# Patient Record
Sex: FEMALE | Race: WHITE | Employment: OTHER | URBAN - METROPOLITAN AREA
[De-identification: names, ages, dates, MRNs, and addresses within clinical notes are randomized per-mention and may not be internally consistent; named-entity substitution may affect disease eponyms.]

---

## 2017-04-28 ENCOUNTER — HOSPITAL ENCOUNTER (OUTPATIENT)
Dept: OTHER | Age: 67
Discharge: HOME | End: 2017-04-28
Attending: INTERNAL MEDICINE | Admitting: INTERNAL MEDICINE

## 2017-04-28 LAB
A/G RATIO: 1.2 (ref 1.1–2.2)
ALBUMIN SERPL-MCNC: 4.1 G/DL (ref 3.4–5)
ALP BLD-CCNC: 94 U/L (ref 40–129)
ALT SERPL-CCNC: 15 U/L (ref 10–40)
ANION GAP SERPL CALCULATED.3IONS-SCNC: 15 MMOL/L (ref 3–16)
AST SERPL-CCNC: 14 U/L (ref 15–37)
BASOPHILS ABSOLUTE: 0.1 K/UL (ref 0–0.2)
BASOPHILS RELATIVE PERCENT: 0.8 %
BILIRUB SERPL-MCNC: 0.8 MG/DL (ref 0–1)
BUN BLDV-MCNC: 15 MG/DL (ref 7–20)
CALCIUM SERPL-MCNC: 8.3 MG/DL (ref 8.3–10.6)
CHLORIDE BLD-SCNC: 101 MMOL/L (ref 99–110)
CHOLESTEROL, TOTAL: 150 MG/DL (ref 0–199)
CO2: 23 MMOL/L (ref 21–32)
CREAT SERPL-MCNC: 0.6 MG/DL (ref 0.6–1.2)
EOSINOPHILS ABSOLUTE: 0.1 K/UL (ref 0–0.6)
EOSINOPHILS RELATIVE PERCENT: 1.3 %
GFR AFRICAN AMERICAN: >60
GFR NON-AFRICAN AMERICAN: >60
GLOBULIN: 3.3 G/DL
GLUCOSE BLD-MCNC: 148 MG/DL (ref 70–99)
HCT VFR BLD CALC: 30.4 % (ref 36–48)
HDLC SERPL-MCNC: 39 MG/DL (ref 40–60)
HEMOGLOBIN: 9 G/DL (ref 12–16)
LDL CHOLESTEROL CALCULATED: 89 MG/DL
LYMPHOCYTES ABSOLUTE: 2.3 K/UL (ref 1–5.1)
LYMPHOCYTES RELATIVE PERCENT: 27.5 %
MCH RBC QN AUTO: 22.6 PG (ref 26–34)
MCHC RBC AUTO-ENTMCNC: 29.4 G/DL (ref 31–36)
MCV RBC AUTO: 76.8 FL (ref 80–100)
MONOCYTES ABSOLUTE: 0.7 K/UL (ref 0–1.3)
MONOCYTES RELATIVE PERCENT: 8.6 %
NEUTROPHILS ABSOLUTE: 5.2 K/UL (ref 1.7–7.7)
NEUTROPHILS RELATIVE PERCENT: 61.8 %
PDW BLD-RTO: 17.5 % (ref 12.4–15.4)
PLATELET # BLD: 252 K/UL (ref 135–450)
PMV BLD AUTO: 9.8 FL (ref 5–10.5)
POTASSIUM SERPL-SCNC: 4.5 MMOL/L (ref 3.5–5.1)
RBC # BLD: 3.96 M/UL (ref 4–5.2)
SODIUM BLD-SCNC: 139 MMOL/L (ref 136–145)
TOTAL PROTEIN: 7.4 G/DL (ref 6.4–8.2)
TRIGL SERPL-MCNC: 108 MG/DL (ref 0–150)
TSH SERPL DL<=0.05 MIU/L-ACNC: 8.03 UIU/ML (ref 0.27–4.2)
VLDLC SERPL CALC-MCNC: 22 MG/DL
WBC # BLD: 8.4 K/UL (ref 4–11)

## 2018-06-29 ENCOUNTER — HOSPITAL ENCOUNTER (OUTPATIENT)
Dept: OTHER | Age: 68
Discharge: HOME | End: 2018-06-29
Attending: INTERNAL MEDICINE | Admitting: INTERNAL MEDICINE

## 2018-06-29 LAB
A/G RATIO: 1.5 (ref 1.1–2.2)
ALBUMIN SERPL-MCNC: 4.2 G/DL (ref 3.4–5)
ALP BLD-CCNC: 76 U/L (ref 40–129)
ALT SERPL-CCNC: 17 U/L (ref 10–40)
ANION GAP SERPL CALCULATED.3IONS-SCNC: 12 MMOL/L (ref 3–16)
AST SERPL-CCNC: 13 U/L (ref 15–37)
BILIRUB SERPL-MCNC: 1.2 MG/DL (ref 0–1)
BUN BLDV-MCNC: 17 MG/DL (ref 7–20)
CALCIUM SERPL-MCNC: 8.7 MG/DL (ref 8.3–10.6)
CHLORIDE BLD-SCNC: 102 MMOL/L (ref 99–110)
CHOLESTEROL, TOTAL: 174 MG/DL (ref 0–199)
CO2: 26 MMOL/L (ref 21–32)
CREAT SERPL-MCNC: 0.6 MG/DL (ref 0.6–1.2)
GFR AFRICAN AMERICAN: >60
GFR NON-AFRICAN AMERICAN: >60
GLOBULIN: 2.8 G/DL
GLUCOSE BLD-MCNC: 117 MG/DL (ref 70–99)
HDLC SERPL-MCNC: 47 MG/DL (ref 40–60)
LDL CHOLESTEROL CALCULATED: 100 MG/DL
POTASSIUM SERPL-SCNC: 4 MMOL/L (ref 3.5–5.1)
SODIUM BLD-SCNC: 140 MMOL/L (ref 136–145)
TOTAL PROTEIN: 7 G/DL (ref 6.4–8.2)
TRIGL SERPL-MCNC: 135 MG/DL (ref 0–150)
VLDLC SERPL CALC-MCNC: 27 MG/DL

## 2018-06-30 LAB
ESTIMATED AVERAGE GLUCOSE: 91.1 MG/DL
HBA1C MFR BLD: 4.8 %

## 2018-07-24 ENCOUNTER — APPOINTMENT (OUTPATIENT)
Dept: GENERAL RADIOLOGY | Age: 68
End: 2018-07-24
Payer: MEDICARE

## 2018-07-24 ENCOUNTER — HOSPITAL ENCOUNTER (EMERGENCY)
Age: 68
Discharge: HOME | End: 2018-07-24
Payer: MEDICARE

## 2018-07-24 VITALS
OXYGEN SATURATION: 95 % | TEMPERATURE: 96.1 F | DIASTOLIC BLOOD PRESSURE: 70 MMHG | HEART RATE: 60 BPM | BODY MASS INDEX: 53.92 KG/M2 | RESPIRATION RATE: 17 BRPM | SYSTOLIC BLOOD PRESSURE: 117 MMHG | WEIGHT: 293 LBS | HEIGHT: 62 IN

## 2018-07-24 DIAGNOSIS — M25.552 PAIN OF BOTH HIP JOINTS: ICD-10-CM

## 2018-07-24 DIAGNOSIS — S90.32XA CONTUSION OF LEFT FOOT, INITIAL ENCOUNTER: Primary | ICD-10-CM
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
## 2018-07-24 DIAGNOSIS — M25.551 PAIN OF BOTH HIP JOINTS: ICD-10-CM

## 2018-07-24 PROCEDURE — 6370000000 HC RX 637 (ALT 250 FOR IP): Performed by: PHYSICIAN ASSISTANT

## 2018-07-24 PROCEDURE — 73630 X-RAY EXAM OF FOOT: CPT

## 2018-07-24 PROCEDURE — 99283 EMERGENCY DEPT VISIT LOW MDM: CPT

## 2018-07-24 PROCEDURE — 72170 X-RAY EXAM OF PELVIS: CPT

## 2018-07-24 RX ORDER — OXYCODONE HYDROCHLORIDE AND ACETAMINOPHEN 5; 325 MG/1; MG/1
1 TABLET ORAL ONCE
Status: COMPLETED | OUTPATIENT
Start: 2018-07-24 | End: 2018-07-24

## 2018-07-24 RX ORDER — IBUPROFEN 600 MG/1
600 TABLET ORAL EVERY 8 HOURS PRN
Qty: 30 TABLET | Refills: 0 | Status: SHIPPED | OUTPATIENT
Start: 2018-07-24 | End: 2018-09-09 | Stop reason: ALTCHOICE

## 2018-07-24 RX ADMIN — OXYCODONE HYDROCHLORIDE AND ACETAMINOPHEN 1 TABLET: 5; 325 TABLET ORAL at 11:47

## 2018-07-24 ASSESSMENT — PAIN DESCRIPTION - ORIENTATION: ORIENTATION: LEFT

## 2018-07-24 ASSESSMENT — PAIN DESCRIPTION - DESCRIPTORS: DESCRIPTORS: STABBING

## 2018-07-24 ASSESSMENT — PAIN DESCRIPTION - FREQUENCY: FREQUENCY: CONTINUOUS

## 2018-07-24 ASSESSMENT — PAIN SCALES - GENERAL
PAINLEVEL_OUTOF10: 10
PAINLEVEL_OUTOF10: 10

## 2018-07-24 ASSESSMENT — PAIN DESCRIPTION - LOCATION: LOCATION: FOOT

## 2018-07-24 ASSESSMENT — PAIN DESCRIPTION - PAIN TYPE: TYPE: ACUTE PAIN

## 2018-07-24 NOTE — ED NOTES
Post op shoe placed on patient, she refused crutches because she has a walker at home.      Areli Wagner  07/24/18 2371

## 2018-07-24 NOTE — ED NOTES
Discharge instructions reviewed with Ms. Adelia Felty. She verbalized understanding. Copy of discharge instructions and prescriptions refused, walker given to patients  per order. Ms. Adelia Felty was discharged to home in good condition per personal vehicle, friend/family driving. She exited the ED without difficulty.         Kwesi Elliott RN  07/24/18 3579

## 2018-07-24 NOTE — ED PROVIDER NOTES
Seen independently        [facility]  eMERGENCY dEPARTMENT eNCOUnter        Pt Name: Carlos Eduardo Garg  MRN: [id number]  [place] 1950  Date of evaluation: 7/24/2018  Provider: Bao Corona PA-C  PCP: Aline Ramsey MD  ED Attending: No att. providers found    [facility]       Chief Complaint   Patient presents with    Foot Pain     pt sts she hurt her left foot yesterday. she got caught in her purse and fell. denies LOC        HISTORY OF PRESENT ILLNESS   (Location/Symptom, Timing/Onset, Context/Setting, Quality, Duration, Modifying Factors, Severity)  Note limiting factors. Carlos Eduardo Garg is a 74 y.o. female patient presents to the emergency department with left foot pain after fall yesterday. The patient states that her foot got caught in her purse that was on the ground at home last night around 9:30. She tripped and fell trying to catch herself on a stand, but continued to fall. She states that this stand hit her in the pelvis and she's had pain since. She states that when she continued to fall she tried to grab a chair, but it toppled down too. She states that she did not hit her head or lose consciousness. No neck or back pain. She states that her left big toe either went forward and backward. She stated that she heard a snap in his had severe left foot and toe pain. She describes it as a 8 out of 10 sharp pain currently. She states that she is having some numbness and hurts to move her toes. She did use ice and did take a oxycodone that she has for arthritis. She states that did help somewhat. Nursing Notes were all reviewed and agreed with or any disagreements were addressed  in the HPI. REVIEW OF SYSTEMS    (2-9 systems for level 4, 10 or more for level 5)     Review of Systems    Positives and Pertinent negatives as per HPI. Except as noted above in the ROS, all other systems were reviewed and negative.        PAST MEDICAL HISTORY     Past Medical eye exhibits no discharge. No scleral icterus. Neck: Normal range of motion. Neck supple. Cardiovascular: Normal rate, regular rhythm, normal heart sounds and intact distal pulses. Exam reveals no gallop and no friction rub. No murmur heard. Pulmonary/Chest: Effort normal and breath sounds normal. No respiratory distress. She has no wheezes. She has no rales. Musculoskeletal:        Right hip: She exhibits tenderness. She exhibits normal range of motion, normal strength, no bony tenderness, no swelling, no crepitus, no deformity and no laceration. Left hip: She exhibits tenderness. She exhibits normal range of motion, normal strength, no bony tenderness, no swelling, no crepitus, no deformity and no laceration. Left ankle: Normal. No tenderness. Achilles tendon normal.        Left foot: There is decreased range of motion (1st and 2nd toe), tenderness, bony tenderness (Is still first metatarsal as well as first and second toe) and swelling (Mild swelling of the first and second toe). There is normal capillary refill, no crepitus, no deformity and no laceration. Neurological: She is alert and oriented to person, place, and time. Skin: Skin is warm and dry. She is not diaphoretic. No pallor. Psychiatric: She has a normal mood and affect. Her behavior is normal. Thought content normal.   Nursing note and vitals reviewed. DIAGNOSTIC RESULTS   LABS:    Labs Reviewed - No data to display    All other labs were within normal range or not returned as of this dictation. EKG: All EKG's are interpreted by the Emergency Department Physician who either signs or Co-signs this chart in the absence of a cardiologist.  Please see their note for interpretation of EKG.       RADIOLOGY:   Non-plain film images such as CT, Ultrasound and MRI are read by the radiologist. Plain radiographic images are visualized and preliminarily interpreted by the  ED Provider with the below or stumbling, initial encounter          DISPOSITION/PLAN   DISPOSITION Decision To Discharge 07/24/2018 11:48:12 AM      PATIENT REFERRED TO:  Muna Chi MD  [address] [place] [address]  [phone]    Schedule an appointment as soon as possible for a visit       [facility] [place] [facility]  [address] [place] [facility]  Go to   If symptoms worsen    Jace Abarca  [address] [facility]  [phone]  Schedule an appointment as soon as possible for a visit         DISCHARGE MEDICATIONS:  Discharge Medication List as of 7/24/2018 11:50 AM      START taking these medications    Details   ibuprofen (ADVIL;MOTRIN) 600 MG tablet Take 1 tablet by mouth every 8 hours as needed for Pain, Disp-30 tablet, R-0Print             DISCONTINUED MEDICATIONS:  Discharge Medication List as of 7/24/2018 11:50 AM      STOP taking these medications       aspirin 81 MG chewable tablet Comments:   Reason for Stopping:         diltiazem (TIAZAC) 240 MG extended release capsule Comments:   Reason for Stopping:                      (Please note that portions of this note were completed with a voice recognition program.  Efforts were made to edit the dictations but occasionally words are mis-transcribed.)    Telma Resendiz PA-C (electronically signed)            Anamaria Real PA-C  07/24/18 1502

## 2018-09-09 ENCOUNTER — APPOINTMENT (OUTPATIENT)
Dept: GENERAL RADIOLOGY | Age: 68
End: 2018-09-09
Payer: MEDICARE

## 2018-09-09 ENCOUNTER — HOSPITAL ENCOUNTER (EMERGENCY)
Age: 68
Discharge: HOME | End: 2018-09-09
Attending: EMERGENCY MEDICINE
Payer: MEDICARE

## 2018-09-09 VITALS
RESPIRATION RATE: 20 BRPM | TEMPERATURE: 97.3 F | HEIGHT: 62 IN | WEIGHT: 293 LBS | BODY MASS INDEX: 53.92 KG/M2 | HEART RATE: 66 BPM | SYSTOLIC BLOOD PRESSURE: 153 MMHG | DIASTOLIC BLOOD PRESSURE: 94 MMHG | OXYGEN SATURATION: 94 %

## 2018-09-09 DIAGNOSIS — J18.9 PNEUMONIA DUE TO ORGANISM: Primary | ICD-10-CM

## 2018-09-09 DIAGNOSIS — J44.1 COPD EXACERBATION (HCC): ICD-10-CM

## 2018-09-09 DIAGNOSIS — R05.9 COUGH: ICD-10-CM

## 2018-09-09 LAB
A/G RATIO: 1.4 (ref 1.1–2.2)
ALBUMIN SERPL-MCNC: 4.4 G/DL (ref 3.4–5)
ALP BLD-CCNC: 95 U/L (ref 40–129)
ALT SERPL-CCNC: 38 U/L (ref 10–40)
ANION GAP SERPL CALCULATED.3IONS-SCNC: 12 MMOL/L (ref 3–16)
AST SERPL-CCNC: 21 U/L (ref 15–37)
BASOPHILS ABSOLUTE: 0 K/UL (ref 0–0.2)
BASOPHILS RELATIVE PERCENT: 0.3 %
BILIRUB SERPL-MCNC: 1.2 MG/DL (ref 0–1)
BUN BLDV-MCNC: 17 MG/DL (ref 7–20)
CALCIUM SERPL-MCNC: 8.7 MG/DL (ref 8.3–10.6)
CHLORIDE BLD-SCNC: 102 MMOL/L (ref 99–110)
CO2: 27 MMOL/L (ref 21–32)
CREAT SERPL-MCNC: 0.6 MG/DL (ref 0.6–1.2)
EOSINOPHILS ABSOLUTE: 0.2 K/UL (ref 0–0.6)
EOSINOPHILS RELATIVE PERCENT: 1.2 %
GFR AFRICAN AMERICAN: >60
GFR NON-AFRICAN AMERICAN: >60
GLOBULIN: 3.1 G/DL
GLUCOSE BLD-MCNC: 131 MG/DL (ref 70–99)
HCT VFR BLD CALC: 38.4 % (ref 36–48)
HEMOGLOBIN: 12.5 G/DL (ref 12–16)
LYMPHOCYTES ABSOLUTE: 2.6 K/UL (ref 1–5.1)
LYMPHOCYTES RELATIVE PERCENT: 18.6 %
MCH RBC QN AUTO: 30.3 PG (ref 26–34)
MCHC RBC AUTO-ENTMCNC: 32.6 G/DL (ref 31–36)
MCV RBC AUTO: 92.9 FL (ref 80–100)
MONOCYTES ABSOLUTE: 0.8 K/UL (ref 0–1.3)
MONOCYTES RELATIVE PERCENT: 5.3 %
NEUTROPHILS ABSOLUTE: 10.6 K/UL (ref 1.7–7.7)
NEUTROPHILS RELATIVE PERCENT: 74.6 %
PDW BLD-RTO: 14.1 % (ref 12.4–15.4)
PLATELET # BLD: 189 K/UL (ref 135–450)
PMV BLD AUTO: 9.8 FL (ref 5–10.5)
POTASSIUM SERPL-SCNC: 4 MMOL/L (ref 3.5–5.1)
RBC # BLD: 4.13 M/UL (ref 4–5.2)
SODIUM BLD-SCNC: 141 MMOL/L (ref 136–145)
TOTAL PROTEIN: 7.5 G/DL (ref 6.4–8.2)
WBC # BLD: 14.2 K/UL (ref 4–11)

## 2018-09-09 PROCEDURE — 85025 COMPLETE CBC W/AUTO DIFF WBC: CPT

## 2018-09-09 PROCEDURE — 96374 THER/PROPH/DIAG INJ IV PUSH: CPT

## 2018-09-09 PROCEDURE — 87040 BLOOD CULTURE FOR BACTERIA: CPT

## 2018-09-09 PROCEDURE — 99285 EMERGENCY DEPT VISIT HI MDM: CPT

## 2018-09-09 PROCEDURE — 6370000000 HC RX 637 (ALT 250 FOR IP): Performed by: EMERGENCY MEDICINE

## 2018-09-09 PROCEDURE — 80053 COMPREHEN METABOLIC PANEL: CPT

## 2018-09-09 PROCEDURE — 71046 X-RAY EXAM CHEST 2 VIEWS: CPT

## 2018-09-09 PROCEDURE — 6360000002 HC RX W HCPCS: Performed by: EMERGENCY MEDICINE

## 2018-09-09 RX ORDER — NEBULIZER ACCESSORIES
1 KIT MISCELLANEOUS DAILY PRN
Qty: 1 KIT | Refills: 0 | Status: SHIPPED | OUTPATIENT
Start: 2018-09-09

## 2018-09-09 RX ORDER — METHYLPREDNISOLONE SODIUM SUCCINATE 125 MG/2ML
125 INJECTION, POWDER, LYOPHILIZED, FOR SOLUTION INTRAMUSCULAR; INTRAVENOUS ONCE
Status: COMPLETED | OUTPATIENT
Start: 2018-09-09 | End: 2018-09-09

## 2018-09-09 RX ORDER — DOXYCYCLINE HYCLATE 100 MG
100 TABLET ORAL 2 TIMES DAILY
Qty: 20 TABLET | Refills: 0 | Status: SHIPPED | OUTPATIENT
Start: 2018-09-09 | End: 2018-09-19

## 2018-09-09 RX ORDER — IPRATROPIUM BROMIDE AND ALBUTEROL SULFATE 2.5; .5 MG/3ML; MG/3ML
1 SOLUTION RESPIRATORY (INHALATION) ONCE
Status: COMPLETED | OUTPATIENT
Start: 2018-09-09 | End: 2018-09-09

## 2018-09-09 RX ORDER — ALBUTEROL SULFATE 2.5 MG/3ML
2.5 SOLUTION RESPIRATORY (INHALATION) EVERY 4 HOURS PRN
Qty: 50 EACH | Refills: 0 | Status: SHIPPED | OUTPATIENT
Start: 2018-09-09

## 2018-09-09 RX ORDER — ALBUTEROL SULFATE 2.5 MG/3ML
2.5 SOLUTION RESPIRATORY (INHALATION)
Status: DISCONTINUED | OUTPATIENT
Start: 2018-09-09 | End: 2018-09-09 | Stop reason: HOSPADM

## 2018-09-09 RX ORDER — METHYLPREDNISOLONE 4 MG/1
TABLET ORAL
Qty: 1 KIT | Refills: 0 | Status: SHIPPED | OUTPATIENT
Start: 2018-09-09 | End: 2018-10-16 | Stop reason: ALTCHOICE

## 2018-09-09 RX ORDER — BENZONATATE 100 MG/1
100 CAPSULE ORAL 3 TIMES DAILY PRN
Qty: 15 CAPSULE | Refills: 0 | Status: SHIPPED | OUTPATIENT
Start: 2018-09-09 | End: 2018-09-14

## 2018-09-09 RX ADMIN — ALBUTEROL SULFATE 2.5 MG: 2.5 SOLUTION RESPIRATORY (INHALATION) at 16:18

## 2018-09-09 RX ADMIN — IPRATROPIUM BROMIDE AND ALBUTEROL SULFATE 1 AMPULE: .5; 3 SOLUTION RESPIRATORY (INHALATION) at 16:18

## 2018-09-09 RX ADMIN — METHYLPREDNISOLONE SODIUM SUCCINATE 125 MG: 125 INJECTION, POWDER, FOR SOLUTION INTRAMUSCULAR; INTRAVENOUS at 16:18

## 2018-09-09 ASSESSMENT — ENCOUNTER SYMPTOMS
VOMITING: 0
COUGH: 1
SORE THROAT: 0
NAUSEA: 0
WHEEZING: 1
SHORTNESS OF BREATH: 1
BACK PAIN: 0
GASTROINTESTINAL NEGATIVE: 1
DIARRHEA: 0
RHINORRHEA: 1
EYE DISCHARGE: 0
ABDOMINAL PAIN: 0

## 2018-09-09 ASSESSMENT — PAIN SCALES - GENERAL: PAINLEVEL_OUTOF10: 4

## 2018-09-09 ASSESSMENT — PAIN DESCRIPTION - PAIN TYPE: TYPE: ACUTE PAIN

## 2018-09-09 NOTE — ED PROVIDER NOTES
mmol/L    Potassium 4.0 3.5 - 5.1 mmol/L    Chloride 102 99 - 110 mmol/L    CO2 27 21 - 32 mmol/L    Anion Gap 12 3 - 16    Glucose 131 (H) 70 - 99 mg/dL    BUN 17 7 - 20 mg/dL    CREATININE 0.6 0.6 - 1.2 mg/dL    GFR Non-African American >60 >60    GFR African American >60 >60    Calcium 8.7 8.3 - 10.6 mg/dL    Total Protein 7.5 6.4 - 8.2 g/dL    Alb 4.4 3.4 - 5.0 g/dL    Albumin/Globulin Ratio 1.4 1.1 - 2.2    Total Bilirubin 1.2 (H) 0.0 - 1.0 mg/dL    Alkaline Phosphatase 95 40 - 129 U/L    ALT 38 10 - 40 U/L    AST 21 15 - 37 U/L    Globulin 3.1 g/dL       All other labs were within normal range or not returned as of this dictation. EKG: All EKG's are interpreted by the Emergency Department Physician who either signs or Co-signs this chart in the absence of a cardiologist.  Please see their note for interpretation of EKG. RADIOLOGY:   Non-plain film images such as CT, Ultrasound and MRI are read by the radiologist. Plain radiographic images are visualized and preliminarily interpreted by the  ED Provider with the below findings:    Interpretation per the Radiologist below, if available at the time of this note:    XR CHEST STANDARD (2 VW)   Final Result   Mild pulmonary vascular congestion. Slight right basilar atelectasis.                PROCEDURES   Unless otherwise noted below, none     Procedures    CRITICAL CARE TIME   N/A    CONSULTS:  None      EMERGENCY DEPARTMENT COURSE and DIFFERENTIAL DIAGNOSIS/MDM:   Vitals:    Vitals:    09/09/18 1533 09/09/18 1727 09/09/18 1744 09/09/18 1745   BP: (!) 146/61   (!) 153/94   Pulse: 59 72 66    Resp: 18  20    Temp: 97.3 °F (36.3 °C)      TempSrc: Oral      SpO2: 96% 93% 94%    Weight: 294 lb (133.4 kg)      Height: 5' 2\" (1.575 m)          Patient was given the following medications:  Medications   ipratropium-albuterol (DUONEB) nebulizer solution 1 ampule (1 ampule Inhalation Given 9/9/18 1618)   methylPREDNISolone sodium (SOLU-MEDROL) injection 125 mg (125 mg Disp-20 tablet, R-0Print      !! Respiratory Therapy Supplies (NEBULIZER COMPRESSOR) KIT ONCE Starting Sun 9/9/2018, 1 dose, Disp-1 kit, R-0, Print      !! Respiratory Therapy Supplies (NEBULIZER/TUBING/MOUTHPIECE) KIT DAILY PRN Starting Sun 9/9/2018, Disp-1 kit, R-0, Print      albuterol (PROVENTIL) (2.5 MG/3ML) 0.083% nebulizer solution Take 3 mLs by nebulization every 4 hours as needed for Wheezing, Disp-50 each, R-0Print      Spacer/Aero-Holding Chambers (E-Z SPACER) ANA DAILY PRN Starting Sun 9/9/2018, Disp-1 Device, R-0, Print      methylPREDNISolone (MEDROL, LIDIA,) 4 MG tablet Take by mouth., Disp-1 kit, R-0Print      benzonatate (TESSALON PERLES) 100 MG capsule Take 1 capsule by mouth 3 times daily as needed for Cough, Disp-15 capsule, R-0Print      albuterol (PROVENTIL HFA) 108 (90 BASE) MCG/ACT inhaler Inhale 2 puffs into the lungs every 6 hours as needed for Wheezing or Shortness of Breath., Disp-1 Inhaler, R-2Print       !! - Potential duplicate medications found. Please discuss with provider.           DISCONTINUED MEDICATIONS:  Discharge Medication List as of 9/9/2018  6:34 PM      STOP taking these medications       zolpidem (AMBIEN CR) 12.5 MG extended release tablet Comments:   Reason for Stopping:         esomeprazole Magnesium (NEXIUM) 20 MG PACK Comments:   Reason for Stopping:                      (Please note that portions of this note were completed with a voice recognition program.  Efforts were made to edit the dictations but occasionally words are mis-transcribed.)    Velia Molina MD (electronically signed)              Velia Molina MD  09/13/18 0320

## 2018-09-09 NOTE — ED NOTES
Discharge instructions reviewed with Ms. uCco Moy. She verbalized understanding. Copy of discharge instructions and prescriptions given. Ms. Cuco Moy was discharged to home in good condition per personal vehicle, friend/family driving. She exited the ED without difficulty.         Caitlin Ireland RN  09/09/18 9970

## 2018-09-09 NOTE — ED PROVIDER NOTES
[address] ED  eMERGENCY dEPARTMENT eNCOUnter        Pt Name: Toro Veliz  MRN: [id number]  [place] 1950  Date of evaluation: 9/9/2018  Provider: Jose Carlos Martin PA-C  PCP: Edwin Delcid MD  ED Attending: Uriel Solomon MD    CHIEF COMPLAINT       Chief Complaint   Patient presents with    Cough     Pt presents with c/o cough and shortness of breath x 2 weeks. Went to urgent care last week and put on abx and cough syrup. +chills. +diarrhea. +n/v.    Shortness of Breath       HISTORY OF PRESENT ILLNESS   (Location/Symptom, Timing/Onset, Context/Setting, Quality, Duration, Modifying Factors, Severity)  Note limiting factors. Toro Veliz is a 74 y.o. female presents with a weeklong history of a productive cough and shortness of breath. Onset 1 week ago. She was seen at urgent care last week and given Augmentin which she has not completed yet. She states that her cough has gotten better since starting Augmentin. She still is complaining of feeling short of breath. She started taking some Claritin as well. Nothing seems to aggravate her symptoms. Nothing seems to relieve her symptoms. Denies chest pain, fever, abdominal pain or nausea. Nursing Notes were all reviewed and agreed with or any disagreements were addressed  in the HPI. REVIEW OF SYSTEMS    (2-9 systems for level 4, 10 or more for level 5)     Review of Systems   Constitutional: Negative. HENT: Negative. Respiratory: Positive for cough and shortness of breath. Cardiovascular: Negative. Gastrointestinal: Negative. Genitourinary: Negative. Musculoskeletal: Negative. Skin: Negative. Neurological: Negative. Hematological: Negative. Positives and Pertinent negatives as per HPI. Except as noted above in the ROS, all other systems were reviewed and negative.        PAST MEDICAL HISTORY     Past Medical History:   Diagnosis Date    Anemia     Anxiety     Bleeding disorder (HCC)     chronic post gastric bypass surgery bleeding    Depression     Hernia of unspecified site of abdominal cavity without mention of obstruction or gangrene     Hernia of belly button. Pt states it's more raised recently    Hypertension     Hypothyroidism     Obese     Osteoarthritis     Other disorders of kidney and ureter     Psoriasis          SURGICAL HISTORY       Past Surgical History:   Procedure Laterality Date    CARDIAC CATHETERIZATION  2012    Cors WNL     SECTION      CHOLECYSTECTOMY      ESOPHAGEAL DILATATION      GALLBLADDER SURGERY      GASTRIC BYPASS SURGERY      PACEMAKER INSERTION  2013    generator replacement    PACEMAKER INSERTION  2006    initial implant    TOTAL KNEE ARTHROPLASTY           CURRENT MEDICATIONS       Discharge Medication List as of 2018  6:34 PM      CONTINUE these medications which have NOT CHANGED    Details   loratadine (CLARITIN) 10 MG tablet Take 10 mg by mouthHistorical Med      busPIRone (BUSPAR) 15 MG tablet Take by mouthHistorical Med      ferrous sulfate 325 (65 FE) MG tablet Take 325 mg by mouth every other day Every other dayHistorical Med      carvedilol (COREG) 12.5 MG tablet Take 1 tablet by mouth 2 times daily (with meals). , Disp-180 tablet, R-3      furosemide (LASIX) 20 MG tablet Take 1 tablet by mouth daily. , Disp-90 tablet, R-3      diltiazem (DILACOR XR) 240 MG ER capsule Take 1 capsule by mouth daily. , Disp-90 capsule, R-3      aspirin EC 81 MG EC tablet Take 1 tablet by mouth 2 times daily. , Disp-180 tablet, R-1      levothyroxine (SYNTHROID) 200 MCG tablet Take 150 mcg by mouth daily.       Lancets Thin MISC Historical Med      Blood Glucose Monitoring Suppl (BLOOD GLUCOSE MONITOR SYSTEM) w/Device KIT Historical Med      Blood Gluc Meter Disp-Strips (BLOOD GLUCOSE METER DISPOSABLE) ANA Historical Med      diclofenac (CATAFLAM) 50 MG tablet Take 1 tablet by mouth 3 times daily (with meals) for 10 days, Disp-30 tablet, R-0Print      Disposable Gloves (LATEX GLOVES LARGE) MISC PRN Starting 7/26/2014, Until Discontinued, Disp-2 each, R-0, Print      nitroGLYCERIN (NITROLINGUAL) 0.4 MG/SPRAY spray Place 1 spray under the tongue every 5 minutes as needed. ALLERGIES     Demerol    FAMILY HISTORY       Family History   Problem Relation Age of Onset    Alcohol Abuse Mother     Depression Mother     Mental Illness Mother    Josephineelizabeth Rivera Diabetes Father     Asthma Sister     Alcohol Abuse Sister     Other Sister         blood disease    Cancer Sister     Diabetes Sister     Alcohol Abuse Brother     Cancer Brother           SOCIAL HISTORY       Social History     Social History    Marital status:      Spouse name: N/A    Number of children: N/A    Years of education: N/A     Social History Main Topics    Smoking status: Never Smoker    Smokeless tobacco: Never Used    Alcohol use No    Drug use: No    Sexual activity: Yes     Partners: Male     Other Topics Concern    None     Social History Narrative    None       SCREENINGS             PHYSICAL EXAM    (up to 7 for level 4, 8 or more for level 5)     ED Triage Vitals [09/09/18 1533]   BP Temp Temp Source Pulse Resp SpO2 Height Weight   (!) 146/61 97.3 °F (36.3 °C) Oral 59 18 96 % 5' 2\" (1.575 m) 294 lb (133.4 kg)       Physical Exam   Constitutional: She is oriented to person, place, and time. She appears well-developed and well-nourished. HENT:   Head: Normocephalic and atraumatic. Nose: Nose normal.   Mouth/Throat: Uvula is midline, oropharynx is clear and moist and mucous membranes are normal.   Eyes: Right eye exhibits no discharge. Left eye exhibits no discharge. Neck: Normal range of motion. Neck supple. Cardiovascular: Normal rate, regular rhythm and normal heart sounds. Exam reveals no gallop. No murmur heard. Pulmonary/Chest: Effort normal. No respiratory distress. She has wheezes. She has no rales.  She TECHNOLOGIST PROVIDED HISTORY: Reason for exam:->cough Ordering Physician Provided Reason for Exam: Pt presents with c/o cough and shortness of breath x 2 weeks. Went to urgent care last week and put on abx and cough syrup. +chills. +diarrhea. +n/v.) Acute symptoms. Initial exam. FINDINGS: Heart size is normal.  The pulmonary vasculature is mildly prominent. No acute airspace disease or pleural effusion present. Lung volumes are somewhat low bilaterally. Slight right basilar atelectasis also present. Dual-chamber cardiac pacemaker in place. Mild pulmonary vascular congestion. Slight right basilar atelectasis. PROCEDURES   Unless otherwise noted below, none     Procedures    CRITICAL CARE TIME   N/A    CONSULTS:  None      EMERGENCY DEPARTMENT COURSE and DIFFERENTIAL DIAGNOSIS/MDM:   Vitals:    Vitals:    09/09/18 1533 09/09/18 1727 09/09/18 1744 09/09/18 1745   BP: (!) 146/61   (!) 153/94   Pulse: 59 72 66    Resp: 18  20    Temp: 97.3 °F (36.3 °C)      TempSrc: Oral      SpO2: 96% 93% 94%    Weight: 294 lb (133.4 kg)      Height: 5' 2\" (1.575 m)          Patient was given the following medications:  Medications   albuterol (PROVENTIL) nebulizer solution 2.5 mg (2.5 mg Nebulization Given 9/9/18 1618)   ipratropium-albuterol (DUONEB) nebulizer solution 1 ampule (1 ampule Inhalation Given 9/9/18 1618)   methylPREDNISolone sodium (SOLU-MEDROL) injection 125 mg (125 mg Intravenous Given 9/9/18 1618)       Patient presents with a weeklong history of a productive cough. On exam she is alert oriented afebrile hemodynamically stable breathing rapidly and room air satting at 96%. Oral mucosa is pink and moist.  Abdomen is soft and nontender. Some wheezing bilaterally mildly. We'll administer breathing treatments and Solu-Medrol here for symptomatic relief. Patient feels much better after administration of breathing treatments.  She was given Augmentin one week ago at urgent care and is still finishing Augmentin at this time. I told her to continue with Augmentin. Chest x-ray shows mild pulmonary vascular congestion with slight right basilar atelectasis. Plan will be to discharge patient at this time. See AVS for discharge medications and instructions. Advised to follow up in one week with Dr. Georgina Rachel. Patient was agreeable to the plan. All questions were answered at this time. I advised her to return with new or worsening symptoms. The patient tolerated their visit well. They were seen and evaluated by the attending physician who agreed with the assessment and plan. The patient and / or the family were informed of the results of any tests, a time was given to answer questions, a plan was proposed and they agreed with plan. FINAL IMPRESSION      1. Pneumonia due to organism    2. Cough    3. COPD exacerbation [facility])          DISPOSITION/PLAN   DISPOSITION        PATIENT REFERRED TO:  Dakota Stern [address]/[facility]  [phone]    Schedule an appointment as soon as possible for a visit in 1 week      Griselda Serna MD  [address] [facility] [place] [facility]  [phone]    Schedule an appointment as soon as possible for a visit in 1 week      [facility]  [address] [address]  [phone]  Go to   If symptoms worsen      DISCHARGE MEDICATIONS:  Discharge Medication List as of 9/9/2018  6:34 PM      START taking these medications    Details   doxycycline hyclate (VIBRA-TABS) 100 MG tablet Take 1 tablet by mouth 2 times daily for 10 days, Disp-20 tablet, R-0Print      !! Respiratory Therapy Supplies (NEBULIZER COMPRESSOR) KIT ONCE Starting Sun 9/9/2018, 1 dose, Disp-1 kit, R-0, Print      !!  Respiratory Therapy Supplies (NEBULIZER/TUBING/MOUTHPIECE) KIT DAILY PRN Starting Sun 9/9/2018, Disp-1 kit, R-0, Print      albuterol (PROVENTIL) (2.5 MG/3ML) 0.083% nebulizer solution Take 3 mLs by nebulization every 4 hours as needed for

## 2018-09-14 LAB — BLOOD CULTURE, ROUTINE: NORMAL

## 2018-10-16 ENCOUNTER — OFFICE VISIT (OUTPATIENT)
Dept: PULMONOLOGY | Age: 68
End: 2018-10-16
Payer: MEDICARE

## 2018-10-16 VITALS
BODY MASS INDEX: 53.26 KG/M2 | WEIGHT: 289.4 LBS | HEART RATE: 55 BPM | RESPIRATION RATE: 20 BRPM | DIASTOLIC BLOOD PRESSURE: 82 MMHG | SYSTOLIC BLOOD PRESSURE: 128 MMHG | HEIGHT: 62 IN | TEMPERATURE: 97.8 F | OXYGEN SATURATION: 96 %

## 2018-10-16 DIAGNOSIS — J31.1 POST-NASAL CATARRH: ICD-10-CM

## 2018-10-16 DIAGNOSIS — J44.9 CHRONIC OBSTRUCTIVE PULMONARY DISEASE, UNSPECIFIED COPD TYPE (HCC): Primary | ICD-10-CM

## 2018-10-16 PROCEDURE — G8484 FLU IMMUNIZE NO ADMIN: HCPCS | Performed by: INTERNAL MEDICINE

## 2018-10-16 PROCEDURE — 1101F PT FALLS ASSESS-DOCD LE1/YR: CPT | Performed by: INTERNAL MEDICINE

## 2018-10-16 PROCEDURE — 1090F PRES/ABSN URINE INCON ASSESS: CPT | Performed by: INTERNAL MEDICINE

## 2018-10-16 PROCEDURE — G8417 CALC BMI ABV UP PARAM F/U: HCPCS | Performed by: INTERNAL MEDICINE

## 2018-10-16 PROCEDURE — G8926 SPIRO NO PERF OR DOC: HCPCS | Performed by: INTERNAL MEDICINE

## 2018-10-16 PROCEDURE — 99204 OFFICE O/P NEW MOD 45 MIN: CPT | Performed by: INTERNAL MEDICINE

## 2018-10-16 PROCEDURE — 3017F COLORECTAL CA SCREEN DOC REV: CPT | Performed by: INTERNAL MEDICINE

## 2018-10-16 PROCEDURE — 3023F SPIROM DOC REV: CPT | Performed by: INTERNAL MEDICINE

## 2018-10-16 PROCEDURE — G8427 DOCREV CUR MEDS BY ELIG CLIN: HCPCS | Performed by: INTERNAL MEDICINE

## 2018-10-16 NOTE — PROGRESS NOTES
response:    IMAGING:  I personally reviewed and interpreted the following imaging today in the office:   CXR: 9/9/18 Mild pulmonary vascular congestion.  Slight right basilar atelectasis. ASSESSMENT:  · Acute bronchitis is resolved  · Chronic allergic rhinitis  · Environmental allergies  · GARCIA  · Morbid obesity    PLAN:   · PFTs with [facility] to evaluate for asthma  · Will call with results  · She may benefit from Singulair due to environmental allergies with chronic rhinitis.

## 2018-10-26 ENCOUNTER — TELEPHONE (OUTPATIENT)
Dept: PULMONOLOGY | Age: 68
End: 2018-10-26

## 2019-10-28 ENCOUNTER — APPOINTMENT (OUTPATIENT)
Dept: CT IMAGING | Age: 69
End: 2019-10-28
Payer: MEDICARE

## 2019-10-28 ENCOUNTER — HOSPITAL ENCOUNTER (EMERGENCY)
Age: 69
Discharge: HOME | End: 2019-10-28
Attending: EMERGENCY MEDICINE
Payer: MEDICARE

## 2019-10-28 VITALS
TEMPERATURE: 97.8 F | OXYGEN SATURATION: 96 % | SYSTOLIC BLOOD PRESSURE: 147 MMHG | RESPIRATION RATE: 16 BRPM | DIASTOLIC BLOOD PRESSURE: 60 MMHG | HEART RATE: 60 BPM

## 2019-10-28 DIAGNOSIS — R10.9 FLANK PAIN: Primary | ICD-10-CM

## 2019-10-28 LAB
A/G RATIO: 1.4 (ref 1.1–2.2)
ALBUMIN SERPL-MCNC: 4.2 G/DL (ref 3.4–5)
ALP BLD-CCNC: 78 U/L (ref 40–129)
ALT SERPL-CCNC: 15 U/L (ref 10–40)
ANION GAP SERPL CALCULATED.3IONS-SCNC: 11 MMOL/L (ref 3–16)
AST SERPL-CCNC: 14 U/L (ref 15–37)
BACTERIA: ABNORMAL /HPF
BASOPHILS ABSOLUTE: 0 K/UL (ref 0–0.2)
BASOPHILS RELATIVE PERCENT: 0.6 %
BILIRUB SERPL-MCNC: 1.1 MG/DL (ref 0–1)
BILIRUBIN URINE: NEGATIVE
BLOOD, URINE: ABNORMAL
BUN BLDV-MCNC: 13 MG/DL (ref 7–20)
CALCIUM SERPL-MCNC: 9.3 MG/DL (ref 8.3–10.6)
CHLORIDE BLD-SCNC: 102 MMOL/L (ref 99–110)
CLARITY: CLEAR
CO2: 25 MMOL/L (ref 21–32)
COLOR: YELLOW
CREAT SERPL-MCNC: 0.7 MG/DL (ref 0.6–1.2)
EOSINOPHILS ABSOLUTE: 0.1 K/UL (ref 0–0.6)
EOSINOPHILS RELATIVE PERCENT: 2 %
EPITHELIAL CELLS, UA: ABNORMAL /HPF
GFR AFRICAN AMERICAN: >60
GFR NON-AFRICAN AMERICAN: >60
GLOBULIN: 2.9 G/DL
GLUCOSE BLD-MCNC: 121 MG/DL (ref 70–99)
GLUCOSE URINE: NEGATIVE MG/DL
HCT VFR BLD CALC: 36 % (ref 36–48)
HEMOGLOBIN: 11.6 G/DL (ref 12–16)
KETONES, URINE: NEGATIVE MG/DL
LEUKOCYTE ESTERASE, URINE: NEGATIVE
LIPASE: 21 U/L (ref 13–60)
LYMPHOCYTES ABSOLUTE: 2.4 K/UL (ref 1–5.1)
LYMPHOCYTES RELATIVE PERCENT: 35.2 %
MCH RBC QN AUTO: 28.2 PG (ref 26–34)
MCHC RBC AUTO-ENTMCNC: 32.3 G/DL (ref 31–36)
MCV RBC AUTO: 87.4 FL (ref 80–100)
MICROSCOPIC EXAMINATION: YES
MONOCYTES ABSOLUTE: 0.4 K/UL (ref 0–1.3)
MONOCYTES RELATIVE PERCENT: 6.5 %
NEUTROPHILS ABSOLUTE: 3.8 K/UL (ref 1.7–7.7)
NEUTROPHILS RELATIVE PERCENT: 55.7 %
NITRITE, URINE: NEGATIVE
PDW BLD-RTO: 15.1 % (ref 12.4–15.4)
PH UA: 5.5 (ref 5–8)
PLATELET # BLD: 198 K/UL (ref 135–450)
PMV BLD AUTO: 9.3 FL (ref 5–10.5)
POTASSIUM REFLEX MAGNESIUM: 4 MMOL/L (ref 3.5–5.1)
PROTEIN UA: NEGATIVE MG/DL
RBC # BLD: 4.11 M/UL (ref 4–5.2)
RBC UA: ABNORMAL /HPF (ref 0–2)
SODIUM BLD-SCNC: 138 MMOL/L (ref 136–145)
SPECIFIC GRAVITY UA: 1.02 (ref 1–1.03)
TOTAL PROTEIN: 7.1 G/DL (ref 6.4–8.2)
URINE REFLEX TO CULTURE: ABNORMAL
URINE TYPE: ABNORMAL
UROBILINOGEN, URINE: 0.2 E.U./DL
WBC # BLD: 6.8 K/UL (ref 4–11)
WBC UA: ABNORMAL /HPF (ref 0–5)

## 2019-10-28 PROCEDURE — 6360000004 HC RX CONTRAST MEDICATION: Performed by: NURSE PRACTITIONER

## 2019-10-28 PROCEDURE — 83690 ASSAY OF LIPASE: CPT

## 2019-10-28 PROCEDURE — 96374 THER/PROPH/DIAG INJ IV PUSH: CPT

## 2019-10-28 PROCEDURE — 96360 HYDRATION IV INFUSION INIT: CPT

## 2019-10-28 PROCEDURE — 6360000002 HC RX W HCPCS: Performed by: NURSE PRACTITIONER

## 2019-10-28 PROCEDURE — 99284 EMERGENCY DEPT VISIT MOD MDM: CPT

## 2019-10-28 PROCEDURE — 80053 COMPREHEN METABOLIC PANEL: CPT

## 2019-10-28 PROCEDURE — 81001 URINALYSIS AUTO W/SCOPE: CPT

## 2019-10-28 PROCEDURE — 2580000003 HC RX 258: Performed by: NURSE PRACTITIONER

## 2019-10-28 PROCEDURE — 85025 COMPLETE CBC W/AUTO DIFF WBC: CPT

## 2019-10-28 PROCEDURE — 74177 CT ABD & PELVIS W/CONTRAST: CPT

## 2019-10-28 RX ORDER — 0.9 % SODIUM CHLORIDE 0.9 %
1000 INTRAVENOUS SOLUTION INTRAVENOUS ONCE
Status: COMPLETED | OUTPATIENT
Start: 2019-10-28 | End: 2019-10-28

## 2019-10-28 RX ORDER — OXYCODONE HYDROCHLORIDE AND ACETAMINOPHEN 5; 325 MG/1; MG/1
1 TABLET ORAL EVERY 4 HOURS PRN
COMMUNITY

## 2019-10-28 RX ORDER — ONDANSETRON 2 MG/ML
4 INJECTION INTRAMUSCULAR; INTRAVENOUS ONCE
Status: COMPLETED | OUTPATIENT
Start: 2019-10-28 | End: 2019-10-28

## 2019-10-28 RX ORDER — MORPHINE SULFATE 4 MG/ML
4 INJECTION, SOLUTION INTRAMUSCULAR; INTRAVENOUS ONCE
Status: DISCONTINUED | OUTPATIENT
Start: 2019-10-28 | End: 2019-10-28 | Stop reason: HOSPADM

## 2019-10-28 RX ADMIN — ONDANSETRON HYDROCHLORIDE 4 MG: 2 INJECTION, SOLUTION INTRAMUSCULAR; INTRAVENOUS at 19:32

## 2019-10-28 RX ADMIN — IOPAMIDOL 75 ML: 755 INJECTION, SOLUTION INTRAVENOUS at 18:48

## 2019-10-28 RX ADMIN — SODIUM CHLORIDE 1000 ML: 9 INJECTION, SOLUTION INTRAVENOUS at 19:07

## 2019-10-28 ASSESSMENT — ENCOUNTER SYMPTOMS
BLOOD IN STOOL: 0
SHORTNESS OF BREATH: 0
CONSTIPATION: 0
ABDOMINAL PAIN: 1
DIARRHEA: 1
VOMITING: 1
BACK PAIN: 1
COUGH: 0
NAUSEA: 1

## 2019-10-28 ASSESSMENT — PAIN SCALES - GENERAL
PAINLEVEL_OUTOF10: 6
PAINLEVEL_OUTOF10: 7

## 2020-01-29 ENCOUNTER — APPOINTMENT (OUTPATIENT)
Dept: GENERAL RADIOLOGY | Age: 70
End: 2020-01-29
Payer: MEDICARE

## 2020-01-29 ENCOUNTER — HOSPITAL ENCOUNTER (EMERGENCY)
Age: 70
Discharge: HOME | End: 2020-01-29
Attending: EMERGENCY MEDICINE
Payer: MEDICARE

## 2020-01-29 VITALS
HEIGHT: 62 IN | SYSTOLIC BLOOD PRESSURE: 154 MMHG | DIASTOLIC BLOOD PRESSURE: 85 MMHG | HEART RATE: 72 BPM | OXYGEN SATURATION: 99 % | WEIGHT: 284 LBS | RESPIRATION RATE: 18 BRPM | TEMPERATURE: 97.8 F | BODY MASS INDEX: 52.26 KG/M2

## 2020-01-29 PROCEDURE — 73562 X-RAY EXAM OF KNEE 3: CPT

## 2020-01-29 PROCEDURE — 99283 EMERGENCY DEPT VISIT LOW MDM: CPT

## 2020-01-29 ASSESSMENT — PAIN DESCRIPTION - PAIN TYPE: TYPE: ACUTE PAIN

## 2020-01-29 ASSESSMENT — PAIN DESCRIPTION - LOCATION: LOCATION: KNEE

## 2020-01-29 ASSESSMENT — PAIN DESCRIPTION - ORIENTATION: ORIENTATION: RIGHT

## 2020-01-29 ASSESSMENT — PAIN SCALES - GENERAL: PAINLEVEL_OUTOF10: 8

## 2020-02-05 ENCOUNTER — OFFICE VISIT (OUTPATIENT)
Dept: ORTHOPEDIC SURGERY | Age: 70
End: 2020-02-05
Payer: MEDICARE

## 2020-02-05 VITALS — BODY MASS INDEX: 52.25 KG/M2 | HEIGHT: 62 IN | WEIGHT: 283.95 LBS

## 2020-02-05 PROCEDURE — G8417 CALC BMI ABV UP PARAM F/U: HCPCS | Performed by: ORTHOPAEDIC SURGERY

## 2020-02-05 PROCEDURE — G8484 FLU IMMUNIZE NO ADMIN: HCPCS | Performed by: ORTHOPAEDIC SURGERY

## 2020-02-05 PROCEDURE — 99202 OFFICE O/P NEW SF 15 MIN: CPT | Performed by: ORTHOPAEDIC SURGERY

## 2020-02-05 PROCEDURE — G8427 DOCREV CUR MEDS BY ELIG CLIN: HCPCS | Performed by: ORTHOPAEDIC SURGERY

## 2020-02-05 PROCEDURE — 1090F PRES/ABSN URINE INCON ASSESS: CPT | Performed by: ORTHOPAEDIC SURGERY

## 2020-02-06 ENCOUNTER — HOSPITAL ENCOUNTER (OUTPATIENT)
Dept: PHYSICAL THERAPY | Age: 70
Setting detail: THERAPIES SERIES
Discharge: HOME | End: 2020-02-06
Payer: MEDICARE

## 2020-02-06 NOTE — FLOWSHEET NOTE
[address] [facility], [address] [place] [address], [address] [place] [address] [address]  Phone: [phone]   Fax:     [phone]    Physical Therapy  Cancellation/No-show Note  Patient Name:  Delfin Terrazas  :  1950   Date:  2020    Cancelled visits to date: 0  No-shows to date: 1    For today's appointment patient:  []  Cancelled  []  Rescheduled appointment  [x]  No-show     Reason given by patient:  []  Patient ill  []  Conflicting appointment  []  No transportation    []  Conflict with work  []  No reason given  [x]  Other: patient scheduled the appt yesterday and did not show today. Comments:      Phone call information:   []  Phone call made today to patient at _ time at number provided:      []  Patient answered, conversation as follows:    []  Patient did not answer, message left as follows:  [x]  Phone call not made today  []  Phone call not needed - pt contacted us to cancel and provided reason for cancellation.      Electronically signed by:  Anthony Kim PT

## 2020-02-06 NOTE — PROGRESS NOTES
aspirin EC 81 MG EC tablet Take 1 tablet by mouth 2 times daily. 180 tablet 1    levothyroxine (SYNTHROID) 200 MCG tablet Take 150 mcg by mouth daily.  nitroGLYCERIN (NITROLINGUAL) 0.4 MG/SPRAY spray Place 1 spray under the tongue every 5 minutes as needed.  Respiratory Therapy Supplies (NEBULIZER COMPRESSOR) KIT 1 kit by Does not apply route once for 1 dose 1 kit 0    albuterol (PROVENTIL HFA) 108 (90 BASE) MCG/ACT inhaler Inhale 2 puffs into the lungs every 6 hours as needed for Wheezing or Shortness of Breath. 1 Inhaler 2     No current facility-administered medications for this visit.         SOCIAL    Social History     Socioeconomic History    Marital status:      Spouse name: Not on file    Number of children: Not on file    Years of education: Not on file    Highest education level: Not on file   Occupational History    Not on file   Social Needs    Financial resource strain: Not on file    Food insecurity:     Worry: Not on file     Inability: Not on file    Transportation needs:     Medical: Not on file     Non-medical: Not on file   Tobacco Use    Smoking status: Never Smoker    Smokeless tobacco: Never Used   Substance and Sexual Activity    Alcohol use: No    Drug use: No    Sexual activity: Yes     Partners: Male   Lifestyle    Physical activity:     Days per week: Not on file     Minutes per session: Not on file    Stress: Not on file   Relationships    Social connections:     Talks on phone: Not on file     Gets together: Not on file     Attends Christian service: Not on file     Active member of club or organization: Not on file     Attends meetings of clubs or organizations: Not on file     Relationship status: Not on file    Intimate partner violence:     Fear of current or ex partner: Not on file     Emotionally abused: Not on file     Physically abused: Not on file     Forced sexual activity: Not on file   Other Topics Concern    Not on file   Social History Narrative    Not on file       FAMILY HISTORY    Family History   Problem Relation Age of Onset    Alcohol Abuse Mother     Depression Mother     Mental Illness Mother    Rafi Alejandre Diabetes Father     Asthma Sister     Alcohol Abuse Sister     Other Sister         blood disease    Cancer Sister     Diabetes Sister     Alcohol Abuse Brother     Cancer Brother        PHYSICAL EXAM    Ht 5' 2.01\" (1.575 m)   Wt 283 lb 15.2 oz (128.8 kg)   BMI 51.92 kg/m² [email or web address]   General:  Patient is alert and orientation to person place and time is age-appropriate. Gait:  Patient walks around the room and in the hallway with a slightly stifflegged antalgic gait. Balance and coordination are age-appropriate. Extremities: She demonstrates neutral alignment and can still achieve full extension. Patellofemoral crepitation is present. Quite significant medial joint line tenderness is present. She stable to varus and valgus stress at 0 and 30 degrees. She has negative Lockman and posterior drawer. I do not palpate any obvious popliteal masses. Negative Baron. Brisk capillary refill at the foot. Skin:  Normal on back and bilateral upper and lower extremities. Spine:  No deformity. Neurological:  Normal motor and sensory exam in both upper and lower extremities. Vascular exam:  Normal in both upper and lower extremities. IMAGING STUDIES    X-rays available for review from previous visits demonstrate moderately advanced osteoarthritic changes of the medial tibiofemoral and patellofemoral spaces. Small early marginal osteophytes. There is still some joint space allowed reasonable lateral preservation.        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 We had a nice discussion regarding the anatomy and pain generators in the knee. We are going to begin with a careful course of the safe use of arthritis medicines, Visco supplementation injections and a low impact exercise program and weight loss. We will solicit approval of the Visco supplementation injections from insurance. I think she is a good candidate given the clinical history outlined above. We did touch briefly upon the role of knee arthroscopy as well as total knee arthroplasty only and salvage situations. Hopefully we explained things clearly and I think she feels comfortable with this plan of care. We appreciate the opportunity to care for this patient. The trust that is implicit in this referral does not go unnoticed. We will do our very best to provide high-quality care that goes above and beyond standards. Please feel free contact us with any questions or concerns about this patient. [facility] and Sports Medicine Surgery    This dictation was performed with a verbal recognition program (DRAGON) and it was checked for errors. It is possible that there are still dictated errors within this office note. If so, please bring any errors to my attention for an addendum. All efforts were made to ensure that this office note is accurate.

## 2020-02-10 ENCOUNTER — HOSPITAL ENCOUNTER (OUTPATIENT)
Dept: PHYSICAL THERAPY | Age: 70
Setting detail: THERAPIES SERIES
Discharge: HOME | End: 2020-02-10
Payer: MEDICARE

## 2020-02-10 PROCEDURE — 97112 NEUROMUSCULAR REEDUCATION: CPT

## 2020-02-10 PROCEDURE — 97161 PT EVAL LOW COMPLEX 20 MIN: CPT

## 2020-02-10 PROCEDURE — 97110 THERAPEUTIC EXERCISES: CPT

## 2020-02-10 PROCEDURE — 97530 THERAPEUTIC ACTIVITIES: CPT

## 2020-02-10 NOTE — FLOWSHEET NOTE
Manual Intervention ([id number])                                                 NMR re-education (79240)   CUES NEEDED                                                                   Therapeutic Activity (56381)   Cues needed for        Gait training with SC   Cane sequencing and swing through gait pattern with fair carryover    Stair training    Cues for sequencing for up with the good and down with the bad as pt was going down the stairs initially with her L leg                                    Therapeutic Exercise and NMR EXR  [x] (00208) Provided verbal/tactile cueing for activities related to strengthening, flexibility, endurance, ROM for improvements in LE, proximal hip, and core control with self care, mobility, lifting, ambulation. [x] (21749) Provided verbal/tactile cueing for activities related to improving balance, coordination, kinesthetic sense, posture, motor skill, proprioception to assist with LE, proximal hip, and core control in self-care, mobility, lifting, ambulation and eccentric single leg control.      NMR and Therapeutic Activities:    [x] (46095 or 64671) Provided verbal/tactile cueing for activities related to improving balance, coordination, kinesthetic sense, posture, motor skill, proprioception and motor activation to allow for proper function of core, proximal hip and LE with self-care and ADLs and functional mobility.   [] (12317) Gait Re-education- Provided training and instruction to the patient for proper LE, core and proximal hip recruitment and positioning and eccentric body weight control with ambulation re-education including up and down stairs     Home Exercise Program:    [x] (56498) Reviewed/Progressed HEP activities related to strengthening, flexibility, endurance, ROM of core, proximal hip and LE for functional self-care, mobility, lifting and ambulation/stair navigation   [] (21279) Reviewed/Progressed HEP activities related to reaching prior level of function. [x] Progressing: [] Met: [] Not Met: [] Adjusted     2. Patient will demonstrate increased AROM to [facility] and with 0/10 pain to allow for proper joint functioning as indicated by patients Functional Deficits. [x] Progressing: [] Met: [] Not Met: [] Adjusted     3. Patient will demonstrate an increase in Strength to good proximal hip strength and control, within 5lb HHD in LE to allow for proper functional mobility as indicated by patients Functional Deficits. [x] Progressing: [] Met: [] Not Met: [] Adjusted     4. Patient will return to all  functional activities without increased symptoms or restriction. [x] Progressing: [] Met: [] Not Met: [] Adjusted     5. Pt will be able to return to driving with 0/61 pain. [] Progressing: [] Met: [] Not Met: [] Adjusted            Overall Progression Towards Functional goals/ Treatment Progress Update:  [] Patient is progressing as expected towards functional goals listed. [] Progression is slowed due to complexities/Impairments listed. [] Progression has been slowed due to co-morbidities.   [x] Plan just implemented, too soon to assess goals progression <30days   [] Goals require adjustment due to lack of progress  [] Patient is not progressing as expected and requires additional follow up with physician  [] Other    Prognosis for POC: [x] Good [] Fair  [] Poor      Patient requires continued skilled intervention: [x] Yes  [] No    Treatment/Activity Tolerance:  [x] Patient able to complete treatment  [] Patient limited by fatigue  [] Patient limited by pain    [] Patient limited by other medical complications  [] Other:     Return to Play: (if applicable)   []  Stage 1: Intro to Strength   []  Stage 2: Return to Run and Strength   []  Stage 3: Return to Jump and Strength   []  Stage 4: Dynamic Strength and Agility   []  Stage 5: Sport Specific Training     []  Ready to Return to Play, Meets All Above Stages   []  Not Ready for Return to Sports   Comments:                          PLAN: See eval  [] Continue per plan of care [] Alter current plan (see comments above)  [x] Plan of care initiated [] Hold pending MD visit [] Discharge    Electronically signed by:  Panchito Morales, PT Devorah Cervantes, SPT    Note: If patient does not return for scheduled/ recommended follow up visits, this note will serve as a discharge from care along with most recent update on progress.

## 2020-02-10 NOTE — PLAN OF CARE
derangement of knee/Hip   []Signs/symptoms consistent with functional hip weakness/NMR control      []Signs/symptoms consistent with tendinitis/tendinosis    []signs/symptoms consistent with pathology which may benefit from Dry needling      []other:     Prognosis/Rehab Potential:      []Excellent   [x]Good    []Fair   []Poor    Tolerance of evaluation/treatment:    []Excellent   []Good    []Fair   [x]Poor    Physical Therapy Evaluation Complexity Justification  [x] A history of present problem with:  [] no personal factors and/or comorbidities that impact the plan of care;  [x]1-2 personal factors and/or comorbidities that impact the plan of care  []3 personal factors and/or comorbidities that impact the plan of care  [x] An examination of body systems using standardized tests and measures addressing any of the following: body structures and functions (impairments), activity limitations, and/or participation restrictions;:  [] a total of 1-2 or more elements   [] a total of 3 or more elements   [x] a total of 4 or more elements   [x] A clinical presentation with:  [x] stable and/or uncomplicated characteristics   [] evolving clinical presentation with changing characteristics  [] unstable and unpredictable characteristics;   [x] Clinical decision making of [x] low, [] moderate, [] high complexity using standardized patient assessment instrument and/or measurable assessment of functional outcome.     [x] EVAL (LOW) 82110 (typically 20 minutes face-to-face)  [] EVAL (MOD) 89470 (typically 30 minutes face-to-face)  [] EVAL (HIGH) 91121 (typically 45 minutes face-to-face)  [] RE-EVAL     PLAN:   Frequency/Duration:  1-2 days per week for 12 Weeks:  Interventions:  [x]  Therapeutic exercise including: strength training, ROM, for Lower extremity and core   [x]  NMR activation and proprioception for LE, Glutes and Core   [x]  Manual therapy as indicated for LE, Hip and spine to include: Dry Needling/IASTM, STM, PROM, Gr I-IV mobilizations, manipulation. [x] Modalities as needed that may include: thermal agents, E-stim, Biofeedback, US, iontophoresis as indicated  [x] Patient education on joint protection, postural re-education, activity modification, progression of HEP. HEP instruction: (see scanned forms)    GOALS:  Patient stated goal: Pain free walking, ability to drive    Therapist goals for Patient: Decrease pain levels and increase strength and ROM to facilitate return to all functional activities without pain. Short Term Goals: To be achieved in: 2 weeks  1. Independent in HEP and progression per patient tolerance, in order to prevent re-injury. [x] Progressing: [] Met: [] Not Met: [] Adjusted     2. Patient will have a decrease in pain to facilitate improvement in movement, function, and ADLs as indicated by Functional Deficits. [x] Progressing: [] Met: [] Not Met: [] Adjusted     Long Term Goals: To be achieved in: 12 weeks  1. Disability index score of 40% or less for the LEFS to assist with reaching prior level of function. [x] Progressing: [] Met: [] Not Met: [] Adjusted     2. Patient will demonstrate increased AROM to [facility] and with 0/10 pain to allow for proper joint functioning as indicated by patients Functional Deficits. [x] Progressing: [] Met: [] Not Met: [] Adjusted     3. Patient will demonstrate an increase in Strength to good proximal hip strength and control, within 5lb HHD in LE to allow for proper functional mobility as indicated by patients Functional Deficits. [x] Progressing: [] Met: [] Not Met: [] Adjusted     4. Patient will return to all  functional activities without increased symptoms or restriction. [x] Progressing: [] Met: [] Not Met: [] Adjusted     5. Pt will be able to return to driving with 0/61 pain.    [x] Progressing: [] Met: [] Not Met: [] Adjusted      Electronically signed by:  Suzette Higgins, PT  Anupama Beaulieu, SPT

## 2020-02-18 ENCOUNTER — HOSPITAL ENCOUNTER (OUTPATIENT)
Dept: PHYSICAL THERAPY | Age: 70
Setting detail: THERAPIES SERIES
Discharge: HOME | End: 2020-02-18
Payer: MEDICARE

## 2020-02-18 PROCEDURE — 97110 THERAPEUTIC EXERCISES: CPT

## 2020-02-18 PROCEDURE — 97112 NEUROMUSCULAR REEDUCATION: CPT

## 2020-02-18 NOTE — FLOWSHEET NOTE
[facility], [address] [place] [address], [address] [place] [address] [address]  Phone: [phone]   Fax:     [phone]      Physical Therapy Treatment Note/ Progress Report:     Date:  2/10/2020    Patient Name:  Keshawn Thomas    :  1950  MRN: [id number]  Restrictions/Precautions:    Medical/Treatment Diagnosis Information:  · Diagnosis: M17.11- Osteoarthritis of right patellofemoral joint S83.241A- Tear of medial meniscus of right knee, current, unspecified tear type, initial encounter  · Treatment Diagnosis: R Knee Pain with Movement Dysfunction  Insurance/Certification information:  PT Insurance Information: Medicare Tricare  Physician Information:  Referring Practitioner: Corine Carrington MD  Has the plan of care been signed (Y/N):        [x]  Yes  []  No     Date of Patient follow up with Physician: TBD    Is this a Progress Report:     []  Yes  [x]  No        If Yes:  Date Range for reporting period:  Beginning 2/10/20  Ending 3/10/20     Progress report will be due (10 Rx or 30 days whichever is less):         Recertification will be due (POC Duration  / 90 days whichever is less): 5/10/20       Visit # Insurance Allowable Auth Required   2 Med Nec []  Yes [x]  No        Functional Scale: 79%    Date assessed:  2/10/20     Latex Allergy:  [x]NO      []YES  Preferred Language for Healthcare:   [x]English       []other:      Pain level: 5/10,   7/10 when doing exercises,      SUBJECTIVE:  Pt reports pain with all exercises in HEP. Pt reports the most pain occurs with overpressure knee flexion and extension. Pt educated on using cane for ambulation outside of the home for safety and to take pressure off R knee.     NV: educate pt on maybe obtaining exercise pedals for home exercise     OBJECTIVE: See eval   Observation:    Test measurements:   R knee Ext: -5 Flex: approx 95 (limited by soft tissue approximation and decrease in pain to facilitate improvement in movement, function, and ADLs as indicated by Functional Deficits. [x] Progressing: [] Met: [] Not Met: [] Adjusted     Long Term Goals: To be achieved in: 12 weeks  1. Disability index score of 40% or less for the LEFS to assist with reaching prior level of function. [x] Progressing: [] Met: [] Not Met: [] Adjusted     2. Patient will demonstrate increased AROM to [facility] and with 0/10 pain to allow for proper joint functioning as indicated by patients Functional Deficits. [x] Progressing: [] Met: [] Not Met: [] Adjusted     3. Patient will demonstrate an increase in Strength to good proximal hip strength and control, within 5lb HHD in LE to allow for proper functional mobility as indicated by patients Functional Deficits. [x] Progressing: [] Met: [] Not Met: [] Adjusted     4. Patient will return to all  functional activities without increased symptoms or restriction. [x] Progressing: [] Met: [] Not Met: [] Adjusted     5. Pt will be able to return to driving with 7/26 pain. [x] Progressing: [] Met: [] Not Met: [] Adjusted            Overall Progression Towards Functional goals/ Treatment Progress Update:  [] Patient is progressing as expected towards functional goals listed. [] Progression is slowed due to complexities/Impairments listed. [] Progression has been slowed due to co-morbidities.   [x] Plan just implemented, too soon to assess goals progression <30days   [] Goals require adjustment due to lack of progress  [] Patient is not progressing as expected and requires additional follow up with physician  [] Other    Prognosis for POC: [x] Good [] Fair  [] Poor      Patient requires continued skilled intervention: [x] Yes  [] No    Treatment/Activity Tolerance:  [x] Patient able to complete treatment  [] Patient limited by fatigue  [] Patient limited by pain    [] Patient limited by other medical complications  [] Other:     Return to Play: (if applicable)   []  Stage 1: Intro to Strength   []  Stage 2: Return to Run and Strength   []  Stage 3: Return to Jump and Strength   []  Stage 4: Dynamic Strength and Agility   []  Stage 5: Sport Specific Training     []  Ready to Return to Play, Meets All Above Stages   []  Not Ready for Return to Sports   Comments:                          PLAN: See eval  [] Continue per plan of care [] Alter current plan (see comments above)  [x] Plan of care initiated [] Hold pending MD visit [] Discharge    Electronically signed by:  Jason Huddleston PT Manda Calles, SPT    Note: If patient does not return for scheduled/ recommended follow up visits, this note will serve as a discharge from care along with most recent update on progress.

## 2020-02-25 ENCOUNTER — HOSPITAL ENCOUNTER (OUTPATIENT)
Dept: PHYSICAL THERAPY | Age: 70
Setting detail: THERAPIES SERIES
Discharge: HOME | End: 2020-02-25
Payer: MEDICARE

## 2020-02-25 PROCEDURE — 97112 NEUROMUSCULAR REEDUCATION: CPT

## 2020-02-25 PROCEDURE — 97110 THERAPEUTIC EXERCISES: CPT

## 2020-02-25 NOTE — FLOWSHEET NOTE
improving balance, coordination, kinesthetic sense, posture, motor skill, proprioception of core, proximal hip and LE for self-care, mobility, lifting, and ambulation/stair navigation      Manual Treatments:  PROM / STM / Oscillations-Mobs:  G-I, II, III, IV (PA's, Inf., Post.)  [x] (60909) Provided manual therapy to mobilize LE, proximal hip and/or LS spine soft tissue/joints for the purpose of modulating pain, promoting relaxation, increasing ROM, reducing/eliminating soft tissue swelling/inflammation/restriction, improving soft tissue extensibility and allowing for proper ROM for normal function with self-care, mobility, lifting and ambulation. Modalities:     [] GAME READY (VASO)- for significant edema, swelling, pain control. Charges:  Timed Code Treatment Minutes: 45   Total Treatment Minutes: 45      [] EVAL (LOW) 28409 (typically 20 minutes face-to-face)  [] EVAL (MOD) 90336 (typically 30 minutes face-to-face)  [] EVAL (HIGH) 11253 (typically 45 minutes face-to-face)  [] RE-EVAL     [x] OX(01507) x 2   [] IONTO  [x] NMR (68920) x 1    [] VASO  [] Manual (31834) x     [] Other:  [] TA x      [] Mech Traction (14545)  [] ES(attended) (99829)      [] ES (un) (17184):    ASSESSMENT:  See eval    GOALS:  Patient stated goal: Pain free walking, ability to drive    Therapist goals for Patient: Decrease pain levels and increase strength and ROM to facilitate return to all functional activities without pain. Short Term Goals: To be achieved in: 2 weeks  1. Independent in HEP and progression per patient tolerance, in order to prevent re-injury. [x] Progressing: [] Met: [] Not Met: [] Adjusted     2. Patient will have a decrease in pain to facilitate improvement in movement, function, and ADLs as indicated by Functional Deficits. [x] Progressing: [] Met: [] Not Met: [] Adjusted     Long Term Goals: To be achieved in: 12 weeks  1.  Disability index score of 40% or less for the LEFS to assist with reaching Sports   Comments:                          PLAN: See eval  [x] Continue per plan of care [] Alter current plan (see comments above)  [] Plan of care initiated [] Hold pending MD visit [] Discharge    Electronically signed by:  Audra Moise, PT Myke Tran, SPT    Note: If patient does not return for scheduled/ recommended follow up visits, this note will serve as a discharge from care along with most recent update on progress.

## 2020-03-03 ENCOUNTER — HOSPITAL ENCOUNTER (OUTPATIENT)
Dept: PHYSICAL THERAPY | Age: 70
Setting detail: THERAPIES SERIES
Discharge: HOME | End: 2020-03-03
Payer: MEDICARE

## 2020-03-03 PROCEDURE — 97110 THERAPEUTIC EXERCISES: CPT

## 2020-03-03 PROCEDURE — 97112 NEUROMUSCULAR REEDUCATION: CPT

## 2020-03-03 NOTE — FLOWSHEET NOTE
kinesthetic sense, posture, motor skill, proprioception of core, proximal hip and LE for self-care, mobility, lifting, and ambulation/stair navigation      Manual Treatments:  PROM / STM / Oscillations-Mobs:  G-I, II, III, IV (PA's, Inf., Post.)  [x] (00822) Provided manual therapy to mobilize LE, proximal hip and/or LS spine soft tissue/joints for the purpose of modulating pain, promoting relaxation, increasing ROM, reducing/eliminating soft tissue swelling/inflammation/restriction, improving soft tissue extensibility and allowing for proper ROM for normal function with self-care, mobility, lifting and ambulation. Modalities:     [] GAME READY (VASO)- for significant edema, swelling, pain control. Charges:  Timed Code Treatment Minutes: 38   Total Treatment Minutes: 38      [] EVAL (LOW) 58634 (typically 20 minutes face-to-face)  [] EVAL (MOD) 62683 (typically 30 minutes face-to-face)  [] EVAL (HIGH) 79892 (typically 45 minutes face-to-face)  [] RE-EVAL     [x] RK(70724) x 2   [] IONTO  [x] NMR (30250) x 1    [] VASO  [] Manual (66255) x     [] Other:  [] TA x      [] Mech Traction (33600)  [] ES(attended) (19628)      [] ES (un) (17206):    ASSESSMENT:  See eval    GOALS:  Patient stated goal: Pain free walking, ability to drive    Therapist goals for Patient: Decrease pain levels and increase strength and ROM to facilitate return to all functional activities without pain. Short Term Goals: To be achieved in: 2 weeks  1. Independent in HEP and progression per patient tolerance, in order to prevent re-injury. [x] Progressing: [] Met: [] Not Met: [] Adjusted     2. Patient will have a decrease in pain to facilitate improvement in movement, function, and ADLs as indicated by Functional Deficits. [x] Progressing: [] Met: [] Not Met: [] Adjusted     Long Term Goals: To be achieved in: 12 weeks  1. Disability index score of 40% or less for the LEFS to assist with reaching prior level of function.    [x] Progressing: [] Met: [] Not Met: [] Adjusted     2. Patient will demonstrate increased AROM to [facility] and with 0/10 pain to allow for proper joint functioning as indicated by patients Functional Deficits. [x] Progressing: [] Met: [] Not Met: [] Adjusted     3. Patient will demonstrate an increase in Strength to good proximal hip strength and control, within 5lb HHD in LE to allow for proper functional mobility as indicated by patients Functional Deficits. [x] Progressing: [] Met: [] Not Met: [] Adjusted     4. Patient will return to all  functional activities without increased symptoms or restriction. [x] Progressing: [] Met: [] Not Met: [] Adjusted     5. Pt will be able to return to driving with 2/71 pain. [x] Progressing: [] Met: [] Not Met: [] Adjusted            Overall Progression Towards Functional goals/ Treatment Progress Update:  [] Patient is progressing as expected towards functional goals listed. [x] Progression is slowed due to complexities/Impairments listed. [] Progression has been slowed due to co-morbidities.   [] Plan just implemented, too soon to assess goals progression <30days   [] Goals require adjustment due to lack of progress  [] Patient is not progressing as expected and requires additional follow up with physician  [] Other    Prognosis for POC: [x] Good [] Fair  [] Poor      Patient requires continued skilled intervention: [x] Yes  [] No    Treatment/Activity Tolerance:  [x] Patient able to complete treatment  [] Patient limited by fatigue  [] Patient limited by pain    [] Patient limited by other medical complications  [] Other:     Return to Play: (if applicable)   []  Stage 1: Intro to Strength   []  Stage 2: Return to Run and Strength   []  Stage 3: Return to Jump and Strength   []  Stage 4: Dynamic Strength and Agility   []  Stage 5: Sport Specific Training     []  Ready to Return to Play, Meets All Above Stages   []  Not Ready for Return to Sports   Comments:

## 2020-03-20 ENCOUNTER — APPOINTMENT (OUTPATIENT)
Dept: PHYSICAL THERAPY | Age: 70
End: 2020-03-20
Payer: MEDICARE

## 2020-11-10 PROCEDURE — 93005 ELECTROCARDIOGRAM TRACING: CPT | Performed by: FAMILY MEDICINE

## 2020-11-10 PROCEDURE — 99284 EMERGENCY DEPT VISIT MOD MDM: CPT

## 2020-11-10 PROCEDURE — 96374 THER/PROPH/DIAG INJ IV PUSH: CPT

## 2020-11-11 ENCOUNTER — APPOINTMENT (OUTPATIENT)
Dept: GENERAL RADIOLOGY | Age: 70
End: 2020-11-11
Payer: MEDICARE

## 2020-11-11 ENCOUNTER — HOSPITAL ENCOUNTER (EMERGENCY)
Age: 70
Discharge: HOME | End: 2020-11-11
Attending: FAMILY MEDICINE
Payer: MEDICARE

## 2020-11-11 VITALS
OXYGEN SATURATION: 97 % | RESPIRATION RATE: 20 BRPM | BODY MASS INDEX: 51.73 KG/M2 | WEIGHT: 274 LBS | HEIGHT: 61 IN | DIASTOLIC BLOOD PRESSURE: 83 MMHG | HEART RATE: 71 BPM | SYSTOLIC BLOOD PRESSURE: 182 MMHG | TEMPERATURE: 98.2 F

## 2020-11-11 LAB
A/G RATIO: 1.4 (ref 1.1–2.2)
ALBUMIN SERPL-MCNC: 4.2 G/DL (ref 3.4–5)
ALP BLD-CCNC: 88 U/L (ref 40–129)
ALT SERPL-CCNC: 21 U/L (ref 10–40)
ANION GAP SERPL CALCULATED.3IONS-SCNC: 10 MMOL/L (ref 3–16)
AST SERPL-CCNC: 21 U/L (ref 15–37)
BASOPHILS ABSOLUTE: 0 K/UL (ref 0–0.2)
BASOPHILS RELATIVE PERCENT: 0.3 %
BILIRUB SERPL-MCNC: 0.9 MG/DL (ref 0–1)
BUN BLDV-MCNC: 16 MG/DL (ref 7–20)
CALCIUM SERPL-MCNC: 8.4 MG/DL (ref 8.3–10.6)
CHLORIDE BLD-SCNC: 104 MMOL/L (ref 99–110)
CO2: 21 MMOL/L (ref 21–32)
CREAT SERPL-MCNC: 0.8 MG/DL (ref 0.6–1.2)
D DIMER: 215 NG/ML DDU (ref 0–229)
EKG ATRIAL RATE: 69 BPM
EKG DIAGNOSIS: NORMAL
EKG P AXIS: 32 DEGREES
EKG P-R INTERVAL: 148 MS
EKG Q-T INTERVAL: 438 MS
EKG QRS DURATION: 90 MS
EKG QTC CALCULATION (BAZETT): 469 MS
EKG R AXIS: 26 DEGREES
EKG T AXIS: 20 DEGREES
EKG VENTRICULAR RATE: 69 BPM
EOSINOPHILS ABSOLUTE: 0 K/UL (ref 0–0.6)
EOSINOPHILS RELATIVE PERCENT: 0.4 %
GFR AFRICAN AMERICAN: >60
GFR NON-AFRICAN AMERICAN: >60
GLOBULIN: 3.1 G/DL
GLUCOSE BLD-MCNC: 178 MG/DL (ref 70–99)
HCT VFR BLD CALC: 34.3 % (ref 36–48)
HEMOGLOBIN: 11.2 G/DL (ref 12–16)
LYMPHOCYTES ABSOLUTE: 1.3 K/UL (ref 1–5.1)
LYMPHOCYTES RELATIVE PERCENT: 15.6 %
MCH RBC QN AUTO: 27.8 PG (ref 26–34)
MCHC RBC AUTO-ENTMCNC: 32.6 G/DL (ref 31–36)
MCV RBC AUTO: 85.5 FL (ref 80–100)
MONOCYTES ABSOLUTE: 0.2 K/UL (ref 0–1.3)
MONOCYTES RELATIVE PERCENT: 2.4 %
NEUTROPHILS ABSOLUTE: 6.8 K/UL (ref 1.7–7.7)
NEUTROPHILS RELATIVE PERCENT: 81.3 %
PDW BLD-RTO: 15.9 % (ref 12.4–15.4)
PLATELET # BLD: 189 K/UL (ref 135–450)
PMV BLD AUTO: 9.2 FL (ref 5–10.5)
POTASSIUM REFLEX MAGNESIUM: 4.3 MMOL/L (ref 3.5–5.1)
RBC # BLD: 4.01 M/UL (ref 4–5.2)
SODIUM BLD-SCNC: 135 MMOL/L (ref 136–145)
TOTAL PROTEIN: 7.3 G/DL (ref 6.4–8.2)
TROPONIN: <0.01 NG/ML
WBC # BLD: 8.4 K/UL (ref 4–11)

## 2020-11-11 PROCEDURE — 84484 ASSAY OF TROPONIN QUANT: CPT

## 2020-11-11 PROCEDURE — 80053 COMPREHEN METABOLIC PANEL: CPT

## 2020-11-11 PROCEDURE — 6360000002 HC RX W HCPCS: Performed by: FAMILY MEDICINE

## 2020-11-11 PROCEDURE — 85025 COMPLETE CBC W/AUTO DIFF WBC: CPT

## 2020-11-11 PROCEDURE — 85379 FIBRIN DEGRADATION QUANT: CPT

## 2020-11-11 PROCEDURE — 93010 ELECTROCARDIOGRAM REPORT: CPT | Performed by: INTERNAL MEDICINE

## 2020-11-11 PROCEDURE — 6370000000 HC RX 637 (ALT 250 FOR IP): Performed by: FAMILY MEDICINE

## 2020-11-11 PROCEDURE — 71046 X-RAY EXAM CHEST 2 VIEWS: CPT

## 2020-11-11 RX ORDER — CYCLOBENZAPRINE HCL 10 MG
10 TABLET ORAL 3 TIMES DAILY PRN
Qty: 20 TABLET | Refills: 0 | Status: SHIPPED | OUTPATIENT
Start: 2020-11-11 | End: 2020-11-18

## 2020-11-11 RX ORDER — CYCLOBENZAPRINE HCL 10 MG
10 TABLET ORAL ONCE
Status: COMPLETED | OUTPATIENT
Start: 2020-11-11 | End: 2020-11-11

## 2020-11-11 RX ORDER — KETOROLAC TROMETHAMINE 30 MG/ML
15 INJECTION, SOLUTION INTRAMUSCULAR; INTRAVENOUS ONCE
Status: COMPLETED | OUTPATIENT
Start: 2020-11-11 | End: 2020-11-11

## 2020-11-11 RX ADMIN — CYCLOBENZAPRINE 10 MG: 10 TABLET, FILM COATED ORAL at 02:07

## 2020-11-11 RX ADMIN — KETOROLAC TROMETHAMINE 15 MG: 30 INJECTION, SOLUTION INTRAMUSCULAR at 02:07

## 2020-11-11 ASSESSMENT — PAIN SCALES - GENERAL: PAINLEVEL_OUTOF10: 6

## 2020-11-11 NOTE — ED PROVIDER NOTES
Triage Chief Complaint:   Shortness of Breath (pt states she has felt short of breath since sunday, pt states she has been having chest pain with the SOB)    [place]:  Alison Doe is a 78 y.o. female that presents with complaints of sense of shortness of breath and left greater than right trapezial paracervical muscle spasm as well as chest pain that wraps around to the front. Patient denies fevers chills. No nausea vomiting. No diarrhea constipation. Patient was seen in urgent care today and prescribed prednisone for rheumatoid arthritis and Augmentin for recent cat scratch and told to come to the emergency department for labs. Patient denies lower extremity edema or calf pain. No dyspnea on exertion orthopnea. No coryza or infectious symptoms. Scratches on her arm are without purulence streaking or nonhealing. No actual bite. Patient is up-to-date on tetanus. Symptoms of been present nonstop for greater than 8 hours.     ROS:  General:  No fevers, no chills, no weakness  Eyes:  No recent vison changes, no discharge  ENT:  No sore throat, no nasal congestion, no hearing changes  Cardiovascular: As above  Respiratory: As above  Gastrointestinal:  No pain, no nausea, no vomiting, no diarrhea  Musculoskeletal:  No muscle pain, no joint pain  Skin:  No rash, no pruritis, no easy bruising  Neurologic:  No speech problems, no headache, no extremity numbness, no extremity tingling, no extremity weakness  Psychiatric:  No anxiety, no hallucinations or delusions, no suicidal or homicidal ideation  Genitourinary:  No dysuria, no hematuria  Endocrine:  No unexpected weight gain, no unexpected weight loss  Extremities:  no edema, no pain    Past Medical History:   Diagnosis Date    Anemia     Anxiety     Bleeding disorder (HCC)     chronic post gastric bypass surgery bleeding    Depression     Hernia of unspecified site of abdominal cavity without mention of obstruction or gangrene     Hernia of belly button.  Pt states it's more raised recently    Hypertension     Hypothyroidism     Obese     Osteoarthritis     Other disorders of kidney and ureter     Psoriasis      Past Surgical History:   Procedure Laterality Date    CARDIAC CATHETERIZATION  2012    Cors WNL     SECTION      CHOLECYSTECTOMY      ESOPHAGEAL DILATATION      GALLBLADDER SURGERY      GASTRIC BYPASS SURGERY      PACEMAKER INSERTION  2013    generator replacement    PACEMAKER INSERTION  2006    initial implant    TOTAL KNEE ARTHROPLASTY       Family History   Problem Relation Age of Onset    Alcohol Abuse Mother     Depression Mother     Mental Illness Mother    Echavarria Diabetes Father     Asthma Sister     Alcohol Abuse Sister     Other Sister         blood disease    Cancer Sister     Diabetes Sister     Alcohol Abuse Brother     Cancer Brother      Social History     Socioeconomic History    Marital status:      Spouse name: Not on file    Number of children: Not on file    Years of education: Not on file    Highest education level: Not on file   Occupational History    Not on file   Social Needs    Financial resource strain: Not on file    Food insecurity     Worry: Not on file     Inability: Not on file    Transportation needs     Medical: Not on file     Non-medical: Not on file   Tobacco Use    Smoking status: Never Smoker    Smokeless tobacco: Never Used   Substance and Sexual Activity    Alcohol use: No    Drug use: No    Sexual activity: Yes     Partners: Male   Lifestyle    Physical activity     Days per week: Not on file     Minutes per session: Not on file    Stress: Not on file   Relationships    Social connections     Talks on phone: Not on file     Gets together: Not on file     Attends Oriental orthodox service: Not on file     Active member of club or organization: Not on file     Attends meetings of clubs or organizations: Not on file     Relationship status: Not on file   [place] and dry. NEUROLOGICAL: No gross facial drooping. Moves all 4 extremities spontaneously. PSYCHIATRIC: Normal mood.     I have reviewed and interpreted all of the currently available lab results from this visit (if applicable):  Results for orders placed or performed during the hospital encounter of 11/11/20   Comprehensive Metabolic Panel w/ Reflex to MG   Result Value Ref Range    Sodium 135 (L) 136 - 145 mmol/L    Potassium reflex Magnesium 4.3 3.5 - 5.1 mmol/L    Chloride 104 99 - 110 mmol/L    CO2 21 21 - 32 mmol/L    Anion Gap 10 3 - 16    Glucose 178 (H) 70 - 99 mg/dL    BUN 16 7 - 20 mg/dL    CREATININE 0.8 0.6 - 1.2 mg/dL    GFR Non-African American >60 >60    GFR African American >60 >60    Calcium 8.4 8.3 - 10.6 mg/dL    Total Protein 7.3 6.4 - 8.2 g/dL    Alb 4.2 3.4 - 5.0 g/dL    Albumin/Globulin Ratio 1.4 1.1 - 2.2    Total Bilirubin 0.9 0.0 - 1.0 mg/dL    Alkaline Phosphatase 88 40 - 129 U/L    ALT 21 10 - 40 U/L    AST 21 15 - 37 U/L    Globulin 3.1 g/dL   CBC auto differential   Result Value Ref Range    WBC 8.4 4.0 - 11.0 K/uL    RBC 4.01 4.00 - 5.20 M/uL    Hemoglobin 11.2 (L) 12.0 - 16.0 g/dL    Hematocrit 34.3 (L) 36.0 - 48.0 %    MCV 85.5 80.0 - 100.0 fL    MCH 27.8 26.0 - 34.0 pg    MCHC 32.6 31.0 - 36.0 g/dL    RDW 15.9 (H) 12.4 - 15.4 %    Platelets 909 319 - 346 K/uL    MPV 9.2 5.0 - 10.5 fL    Neutrophils % 81.3 %    Lymphocytes % 15.6 %    Monocytes % 2.4 %    Eosinophils % 0.4 %    Basophils % 0.3 %    Neutrophils Absolute 6.8 1.7 - 7.7 K/uL    Lymphocytes Absolute 1.3 1.0 - 5.1 K/uL    Monocytes Absolute 0.2 0.0 - 1.3 K/uL    Eosinophils Absolute 0.0 0.0 - 0.6 K/uL    Basophils Absolute 0.0 0.0 - 0.2 K/uL   Troponin   Result Value Ref Range    Troponin <0.01 <0.01 ng/mL   D-dimer, quantitative   Result Value Ref Range    D-Dimer, Quant 215 0 - 229 ng/mL DDU   EKG 12 Lead   Result Value Ref Range    Ventricular Rate 69 BPM    Atrial Rate 69 BPM    P-R Interval 148 ms    QRS Duration 90 ms Q-T Interval 438 ms    QTc Calculation (Bazett) 469 ms    P Axis 32 degrees    R Axis 26 degrees    T Axis 20 degrees    Diagnosis       Atrial-paced rhythmAbnormal ECGWhen compared with ECG of 15-MAY-2016 07:18,No significant change was foundConfirmed by Christen Peterson MD, [address] ([id number]) on 11/11/2020 12:44:19 PM      Radiographs (if obtained):  [] The following radiograph was interpreted by myself in the absence of a radiologist:   [] Radiologist's Report Reviewed:  XR CHEST (2 VW)   Final Result   No acute cardiopulmonary findings. EKG (if obtained): (All EKG's are interpreted by myself in the absence of a cardiologist) atrial paced with no change compared to previous    Chart review shows recent radiographs:  No results found. MDM:  72-year-old female who presents with what I believe is more muscle spasm muscle skeletal pain than cardiac. EKG is atrial paced and unchanged. Troponin is negative in the setting of constant symptoms making this reassuring that is not ACS. D-dimer is negative for evidence of PE or DVT. CBC with no leukocytosis or anemia. Metabolic panel with no metabolic acidosis electrolyte abnormalities    Chest x-ray with no acute cardiopulmonary findings. Patient treated with Flexeril and Toradol in the emergency department. She has previous pain management and narcotic addiction issues and specifically requests no narcotic medications. On repeat evaluation she is feeling significantly better. Trigger points are less tender. I have a low index of suspicion for ACS or PE or other acute medical surgical life threat. No evidence of Boerhaave's or infiltrate or pneumothorax pleural effusion cardiomegaly wide mediastinum. No evidence of sepsis bacteremia. No evidence of cellulitis. Patient will continue prednisone and Augmentin as prescribed by outside clinic and started on Flexeril for muscle spasms.     I estimate there is LOW risk for (including but not limited to) ACUTE provider for clarification.       Senait Jaquez MD  11/12/20 6006

## 2021-03-01 ENCOUNTER — HOSPITAL ENCOUNTER (EMERGENCY)
Age: 71
Discharge: HOME | End: 2021-03-01
Payer: MEDICARE

## 2021-03-01 ENCOUNTER — APPOINTMENT (OUTPATIENT)
Dept: GENERAL RADIOLOGY | Age: 71
End: 2021-03-01
Payer: MEDICARE

## 2021-03-01 VITALS
OXYGEN SATURATION: 100 % | HEIGHT: 62 IN | RESPIRATION RATE: 16 BRPM | SYSTOLIC BLOOD PRESSURE: 137 MMHG | DIASTOLIC BLOOD PRESSURE: 84 MMHG | HEART RATE: 65 BPM | TEMPERATURE: 99 F | BODY MASS INDEX: 50.42 KG/M2 | WEIGHT: 274 LBS

## 2021-03-01 DIAGNOSIS — R00.2 PALPITATIONS: Primary | ICD-10-CM

## 2021-03-01 DIAGNOSIS — R07.9 NONSPECIFIC CHEST PAIN: ICD-10-CM

## 2021-03-01 LAB
A/G RATIO: 1.4 (ref 1.1–2.2)
ALBUMIN SERPL-MCNC: 4.1 G/DL (ref 3.4–5)
ALP BLD-CCNC: 87 U/L (ref 40–129)
ALT SERPL-CCNC: 21 U/L (ref 10–40)
ANION GAP SERPL CALCULATED.3IONS-SCNC: 9 MMOL/L (ref 3–16)
AST SERPL-CCNC: 14 U/L (ref 15–37)
BASOPHILS ABSOLUTE: 0 K/UL (ref 0–0.2)
BASOPHILS RELATIVE PERCENT: 0.4 %
BILIRUB SERPL-MCNC: 0.7 MG/DL (ref 0–1)
BUN BLDV-MCNC: 13 MG/DL (ref 7–20)
CALCIUM SERPL-MCNC: 8.6 MG/DL (ref 8.3–10.6)
CHLORIDE BLD-SCNC: 107 MMOL/L (ref 99–110)
CO2: 24 MMOL/L (ref 21–32)
CREAT SERPL-MCNC: 0.7 MG/DL (ref 0.6–1.2)
EKG ATRIAL RATE: 71 BPM
EKG DIAGNOSIS: NORMAL
EKG P AXIS: 54 DEGREES
EKG P-R INTERVAL: 168 MS
EKG Q-T INTERVAL: 422 MS
EKG QRS DURATION: 84 MS
EKG QTC CALCULATION (BAZETT): 458 MS
EKG R AXIS: 44 DEGREES
EKG T AXIS: 35 DEGREES
EKG VENTRICULAR RATE: 71 BPM
EOSINOPHILS ABSOLUTE: 0.1 K/UL (ref 0–0.6)
EOSINOPHILS RELATIVE PERCENT: 1.4 %
GFR AFRICAN AMERICAN: >60
GFR NON-AFRICAN AMERICAN: >60
GLOBULIN: 2.9 G/DL
GLUCOSE BLD-MCNC: 106 MG/DL (ref 70–99)
HCT VFR BLD CALC: 34.2 % (ref 36–48)
HEMOGLOBIN: 10.8 G/DL (ref 12–16)
LYMPHOCYTES ABSOLUTE: 2.7 K/UL (ref 1–5.1)
LYMPHOCYTES RELATIVE PERCENT: 26.8 %
MCH RBC QN AUTO: 25.8 PG (ref 26–34)
MCHC RBC AUTO-ENTMCNC: 31.7 G/DL (ref 31–36)
MCV RBC AUTO: 81.2 FL (ref 80–100)
MONOCYTES ABSOLUTE: 0.7 K/UL (ref 0–1.3)
MONOCYTES RELATIVE PERCENT: 6.8 %
NEUTROPHILS ABSOLUTE: 6.5 K/UL (ref 1.7–7.7)
NEUTROPHILS RELATIVE PERCENT: 64.6 %
PDW BLD-RTO: 16.3 % (ref 12.4–15.4)
PLATELET # BLD: 205 K/UL (ref 135–450)
PMV BLD AUTO: 8.7 FL (ref 5–10.5)
POTASSIUM REFLEX MAGNESIUM: 3.7 MMOL/L (ref 3.5–5.1)
PRO-BNP: 113 PG/ML (ref 0–124)
RBC # BLD: 4.21 M/UL (ref 4–5.2)
SODIUM BLD-SCNC: 140 MMOL/L (ref 136–145)
TOTAL PROTEIN: 7 G/DL (ref 6.4–8.2)
TROPONIN: <0.01 NG/ML
WBC # BLD: 10 K/UL (ref 4–11)

## 2021-03-01 PROCEDURE — 80053 COMPREHEN METABOLIC PANEL: CPT

## 2021-03-01 PROCEDURE — 99283 EMERGENCY DEPT VISIT LOW MDM: CPT

## 2021-03-01 PROCEDURE — 84484 ASSAY OF TROPONIN QUANT: CPT

## 2021-03-01 PROCEDURE — 83880 ASSAY OF NATRIURETIC PEPTIDE: CPT

## 2021-03-01 PROCEDURE — 93010 ELECTROCARDIOGRAM REPORT: CPT | Performed by: INTERNAL MEDICINE

## 2021-03-01 PROCEDURE — 93005 ELECTROCARDIOGRAM TRACING: CPT | Performed by: PHYSICIAN ASSISTANT

## 2021-03-01 PROCEDURE — 85025 COMPLETE CBC W/AUTO DIFF WBC: CPT

## 2021-03-01 PROCEDURE — 71045 X-RAY EXAM CHEST 1 VIEW: CPT

## 2021-03-01 ASSESSMENT — PAIN DESCRIPTION - LOCATION: LOCATION: CHEST

## 2021-03-01 ASSESSMENT — ENCOUNTER SYMPTOMS
ABDOMINAL PAIN: 0
BACK PAIN: 0
SHORTNESS OF BREATH: 1
SORE THROAT: 0
VOMITING: 0
EYE PAIN: 0
COUGH: 0
NAUSEA: 0

## 2021-03-01 NOTE — ED NOTES
1741 - called [facility] to just let pt know that they were here per [facility]. Pt's Doctor is Dr. Virginie Ray. 200 - Dr. Unique Price for [facility] called back.       Reji Rodriguez  03/01/21 1746

## 2021-03-01 NOTE — ED PROVIDER NOTES
Reviewed ECG completed for Sommer Lee.  This patient was seen solely by midlevel provider. I was available for consultation but I did not see this patient and was not involved in their care. ECG  The Ekg interpreted by me shows  normal pacemaker rhythm with a rate of 71  Axis is   Normal  QTc is  prolonged  Intervals and Durations are unremarkable.       ST Segments: nonspecific changes  No significant change from prior EKG dated 11/10/20        Katerine Rueda MD  03/01/21 4836

## 2021-03-01 NOTE — ED NOTES
Patient being discharged home, discharge instructions printed and reviewed with patient, denies any questions. Patient ambulated off unit with family no signs of distress.      Alba Barragan RN  03/01/21 1554

## 2021-03-01 NOTE — ED NOTES
Patient has [employer] pacemaker, tried to interrogate pacemaker with [employer], pacemaker was not found. [facility] notified. Patient has given number [phone] to writer to call for [employer] staff to come and interrogate pacemaker.       Mehdi Ruvalcaba RN  03/01/21 0902

## 2021-03-02 NOTE — ED PROVIDER NOTES
[address] ED  EMERGENCY DEPARTMENT ENCOUNTER        Pt Name: Burgess Nunes  MRN: [id number]  [place] 1950  Date of evaluation: 3/1/2021  Provider: SIGRID Zafar  PCP: Nehemias Hemphill MD    AGUILAR. I have evaluated this patient. My supervising physician was available for consultation. CHIEF COMPLAINT       Chief Complaint   Patient presents with    Pacemaker Problem     pt states she is having episodes of feeling \"like her pacemaker is pacing\". feels a buzzing churning in her throat and has some pain and sob. HISTORY OF PRESENT ILLNESS   (Location, Timing/Onset, Context/Setting, Quality, Duration, Modifying Factors, Severity, Associated Signs and Symptoms)  Note limiting factors. Burgess Nunes is a 78 y.o. female presents the emergency department with concern for pacer malfunction. Patient reports that since Saturday, she has had intermittent sensations of palpitations, \"bouncing\" of her neck. They will last for a few seconds before completely resolving. She has the exact same sensations when she has had her pacemaker tested in the past.  The sensation will take her breath away and she will experience some pain at the site posterior right shoulder. She is concerned that her pacemaker is malfunctioning. She denies fever, abdominal pain, nausea, vomiting, diaphoresis, hemoptysis, calf swelling or pain, headache, vision changes, facial droop, slurred speech, numbness, weakness. Called her cardiologist Dr. Rigoberto Albert, was unable to get a hold of him hence presentation to the emergency room. Nursing Notes were all reviewed and agreed with or any disagreements were addressed in the HPI. REVIEW OF SYSTEMS    (2-9 systems for level 4, 10 or more for level 5)     Review of Systems   Constitutional: Negative for fever. HENT: Negative for sore throat. Eyes: Negative for pain and visual disturbance. Respiratory: Positive for shortness of breath. Negative for cough. Cardiovascular: Positive for chest pain and palpitations. Gastrointestinal: Negative for abdominal pain, nausea and vomiting. Genitourinary: Negative for dysuria and frequency. Musculoskeletal: Negative for back pain and neck pain. Skin: Negative for rash. Neurological: Negative for dizziness, weakness, numbness and headaches. Psychiatric/Behavioral: Negative for confusion. Positives and Pertinent negatives as per HPI. Except as noted above in the ROS, all other systems were reviewed and negative. PAST MEDICAL HISTORY     Past Medical History:   Diagnosis Date    Anemia     Anxiety     Bleeding disorder ([facility] [address]     chronic post gastric bypass surgery bleeding    Depression     Hernia of unspecified site of abdominal cavity without mention of obstruction or gangrene     Hernia of belly button. Pt states it's more raised recently    Hypertension     Hypothyroidism     Obese     Osteoarthritis     Other disorders of kidney and ureter     Psoriasis          SURGICAL HISTORY     Past Surgical History:   Procedure Laterality Date    CARDIAC CATHETERIZATION  2012    Cors WNL     SECTION      CHOLECYSTECTOMY      ESOPHAGEAL DILATATION      GALLBLADDER SURGERY      GASTRIC BYPASS SURGERY      PACEMAKER INSERTION  2013    generator replacement    PACEMAKER INSERTION  2006    initial implant    TOTAL KNEE ARTHROPLASTY           [address]       Discharge Medication List as of 3/1/2021  5:37 PM      CONTINUE these medications which have NOT CHANGED    Details   oxyCODONE-acetaminophen (PERCOCET) 5-325 MG per tablet Take 1 tablet by mouth every 4 hours as needed for Pain. Rx for 120 / month / Dr Lyndsay Buckner for arthritis. Historical Med      Respiratory Therapy Supplies (NEBULIZER/TUBING/MOUTHPIECE) KIT DAILY PRN Starting Sun 2018, Disp-1 kit, R-0, Print      albuterol (PROVENTIL) (2.5 MG/3ML) 0.083% nebulizer solution Take 3 mLs by nebulization every 4 hours as needed for Wheezing, Disp-50 each, R-0Print      Spacer/Aero-Holding Chambers (E-Z SPACER) ANA DAILY PRN Starting Sun 9/9/2018, Disp-1 Device, R-0, Print      loratadine (CLARITIN) 10 MG tablet Take 10 mg by mouthHistorical Med      Lancets Thin MISC Historical Med      busPIRone (BUSPAR) 15 MG tablet Take by mouthHistorical Med      Blood Glucose Monitoring Suppl (BLOOD GLUCOSE MONITOR SYSTEM) w/Device KIT Historical Med      Blood Gluc Meter Disp-Strips (BLOOD GLUCOSE METER DISPOSABLE) ANA Historical Med      ferrous sulfate 325 (65 FE) MG tablet Take 325 mg by mouth every other day Every other dayHistorical Med      Disposable Gloves (LATEX GLOVES LARGE) MISC PRN Starting 7/26/2014, Until Discontinued, Disp-2 each, R-0, Print      carvedilol (COREG) 12.5 MG tablet Take 1 tablet by mouth 2 times daily (with meals). , Disp-180 tablet, R-3      furosemide (LASIX) 20 MG tablet Take 1 tablet by mouth daily. , Disp-90 tablet, R-3      diltiazem (DILACOR XR) 240 MG ER capsule Take 1 capsule by mouth daily. , Disp-90 capsule, R-3      aspirin EC 81 MG EC tablet Take 1 tablet by mouth 2 times daily. , Disp-180 tablet, R-1      albuterol (PROVENTIL HFA) 108 (90 BASE) MCG/ACT inhaler Inhale 2 puffs into the lungs every 6 hours as needed for Wheezing or Shortness of Breath., Disp-1 Inhaler, R-2Print      levothyroxine (SYNTHROID) 200 MCG tablet Take 150 mcg by mouth daily. nitroGLYCERIN (NITROLINGUAL) 0.4 MG/SPRAY spray Place 1 spray under the tongue every 5 minutes as needed.                ALLERGIES     Demerol and Demerol hcl [meperidine]    FAMILYHISTORY       Family History   Problem Relation Age of Onset    Alcohol Abuse Mother     Depression Mother     Mental Illness Mother     Diabetes Father     Asthma Sister     Alcohol Abuse Sister     Other Sister         blood disease    Cancer Sister     Diabetes Sister     Alcohol Abuse Brother     Cancer Brother           SOCIAL present. Mental Status: She is alert and oriented to person, place, and time. Cranial Nerves: No cranial nerve deficit. Sensory: No sensory deficit. Motor: No weakness. Coordination: Coordination normal.      Gait: Gait normal.   Psychiatric:         Mood and Affect: Mood normal.         Behavior: Behavior normal.         DIAGNOSTIC RESULTS   LABS:    Labs Reviewed   CBC WITH AUTO DIFFERENTIAL - Abnormal; Notable for the following components:       Result Value    Hemoglobin 10.8 (*)     Hematocrit 34.2 (*)     MCH 25.8 (*)     RDW 16.3 (*)     All other components within normal limits    Narrative:     Performed at:  [facility],  [place], [facility]   Phone [phone]   COMPREHENSIVE METABOLIC PANEL W/ REFLEX TO MG FOR LOW K - Abnormal; Notable for the following components:    Glucose 106 (*)     AST 14 (*)     All other components within normal limits    Narrative:     Performed at:  [facility],  [place], [facility]   Phone [phone]   TROPONIN    Narrative:     Performed at:  [place] [address],  [place], [facility]   Phone [phone]   BRAIN NATRIURETIC PEPTIDE    Narrative:     Performed at:  [facility],  [place] [email or web address]   Phone [phone]       All other labs were within normal range or not returned as of this dictation. EKG: All EKG's are interpreted by the Emergency Department Physician in the absence of a cardiologist.  Please see their note for interpretation of EKG.       RADIOLOGY:   Non-plain film images such as CT, Ultrasound and MRI are read by the radiologist. Plain radiographic images are visualized and preliminarily interpreted by the ED Provider with the below findings:        Interpretation per the Radiologist below, if available at the time of this since.  Discussed the case with patient, she is relieved that her pacemaker is not malfunctioning. I did discuss with patient that while her troponin is negative and EKG is nonischemic, she has a heart score of 6 placing her at greater than 2% risk for 30-day Mace and would recommend admission for further cardiac risk assessment. Patient is capable decision-maker, voices understanding of the risks and benefits of admission and inpatient work-up, states \"There is never 0 risk, I feel comfortable being discharged home MI pacemaker will continue to pace me and I can follow-up with my cardiologist.\"  She had asked me to place a call to Dr. Yareli Rose to know about the findings from the emergency room. Discussed with his colleague Dr. Ace Rodríguez he says that he will be sending a message to Dr. Yareli Rose. This is a informed refusal for admission. I did tell patient that she can return anytime for further evaluation and likely admission, and to return immediately for any new or worsening symptoms including but not limited to worsening or more persistent chest pain, coughing blood, capsular pain, abdominal pain, severe nausea or vomiting, numbness, weakness, any other symptoms she is concerned about. Verbal and written discharge instructions and return precautions given. FINAL IMPRESSION      1. Palpitations    2. Nonspecific chest pain          DISPOSITION/PLAN   DISPOSITION Decision To Discharge 03/01/2021 05:34:14 PM      PATIENT REFERREDTO:  Tita Sosa MD  [address] [place] [address]  [phone]    Call in 1 day  Call to schedule cardiology follow-up ideally to be seen within 1 to 2 days.       DISCHARGE MEDICATIONS:  Discharge Medication List as of 3/1/2021  5:37 PM          DISCONTINUED MEDICATIONS:  Discharge Medication List as of 3/1/2021  5:37 PM                 (Please note that portions of this note were completed with a voice recognition program.  Efforts were made to edit the dictations but occasionally words are mis-transcribed.)    SIGRID Thompson (electronically signed)         SIGRID Thompson  03/01/21 1932

## 2022-06-03 ENCOUNTER — HOSPITAL ENCOUNTER (EMERGENCY)
Age: 72
Discharge: LEFT BEFORE DISCHARGE | End: 2022-06-03
Payer: MEDICARE

## 2022-06-03 VITALS
BODY MASS INDEX: 48.21 KG/M2 | WEIGHT: 262 LBS | OXYGEN SATURATION: 96 % | DIASTOLIC BLOOD PRESSURE: 75 MMHG | TEMPERATURE: 97 F | HEIGHT: 62 IN | SYSTOLIC BLOOD PRESSURE: 145 MMHG | HEART RATE: 64 BPM | RESPIRATION RATE: 18 BRPM

## 2022-06-03 LAB
EKG ATRIAL RATE: 65 BPM
EKG DIAGNOSIS: NORMAL
EKG P AXIS: 4 DEGREES
EKG P-R INTERVAL: 124 MS
EKG Q-T INTERVAL: 456 MS
EKG QRS DURATION: 80 MS
EKG QTC CALCULATION (BAZETT): 474 MS
EKG R AXIS: 10 DEGREES
EKG T AXIS: 10 DEGREES
EKG VENTRICULAR RATE: 65 BPM

## 2022-06-03 PROCEDURE — 93010 ELECTROCARDIOGRAM REPORT: CPT | Performed by: INTERNAL MEDICINE

## 2022-06-03 PROCEDURE — 93005 ELECTROCARDIOGRAM TRACING: CPT | Performed by: STUDENT IN AN ORGANIZED HEALTH CARE EDUCATION/TRAINING PROGRAM

## 2022-06-03 ASSESSMENT — PAIN DESCRIPTION - LOCATION: LOCATION: CHEST

## 2022-06-03 ASSESSMENT — PAIN SCALES - GENERAL: PAINLEVEL_OUTOF10: 8

## 2022-06-03 ASSESSMENT — PAIN DESCRIPTION - DESCRIPTORS: DESCRIPTORS: DISCOMFORT

## 2022-06-03 ASSESSMENT — PAIN - FUNCTIONAL ASSESSMENT: PAIN_FUNCTIONAL_ASSESSMENT: 0-10

## 2022-08-31 ENCOUNTER — HOSPITAL ENCOUNTER (EMERGENCY)
Age: 72
Discharge: HOME | End: 2022-08-31
Attending: EMERGENCY MEDICINE
Payer: MEDICARE

## 2022-08-31 ENCOUNTER — APPOINTMENT (OUTPATIENT)
Dept: GENERAL RADIOLOGY | Age: 72
End: 2022-08-31
Payer: MEDICARE

## 2022-08-31 VITALS
SYSTOLIC BLOOD PRESSURE: 140 MMHG | DIASTOLIC BLOOD PRESSURE: 80 MMHG | BODY MASS INDEX: 47.84 KG/M2 | OXYGEN SATURATION: 98 % | RESPIRATION RATE: 19 BRPM | WEIGHT: 260 LBS | HEIGHT: 62 IN | TEMPERATURE: 98.7 F | HEART RATE: 74 BPM

## 2022-08-31 DIAGNOSIS — U07.1 COVID-19: Primary | ICD-10-CM

## 2022-08-31 DIAGNOSIS — R05.9 COUGH: ICD-10-CM

## 2022-08-31 LAB
A/G RATIO: 1.3 (ref 1.1–2.2)
ALBUMIN SERPL-MCNC: 4.2 G/DL (ref 3.4–5)
ALP BLD-CCNC: 80 U/L (ref 40–129)
ALT SERPL-CCNC: 17 U/L (ref 10–40)
ANION GAP SERPL CALCULATED.3IONS-SCNC: 12 MMOL/L (ref 3–16)
AST SERPL-CCNC: 22 U/L (ref 15–37)
BASOPHILS ABSOLUTE: 0 K/UL (ref 0–0.2)
BASOPHILS RELATIVE PERCENT: 0.2 %
BILIRUB SERPL-MCNC: 0.9 MG/DL (ref 0–1)
BILIRUBIN URINE: NEGATIVE
BLOOD, URINE: NEGATIVE
BUN BLDV-MCNC: 14 MG/DL (ref 7–20)
CALCIUM SERPL-MCNC: 8.9 MG/DL (ref 8.3–10.6)
CHLORIDE BLD-SCNC: 103 MMOL/L (ref 99–110)
CLARITY: CLEAR
CO2: 22 MMOL/L (ref 21–32)
COLOR: YELLOW
CREAT SERPL-MCNC: 0.9 MG/DL (ref 0.6–1.2)
D DIMER: 0.34 UG/ML FEU (ref 0–0.6)
EOSINOPHILS ABSOLUTE: 0.1 K/UL (ref 0–0.6)
EOSINOPHILS RELATIVE PERCENT: 1.8 %
GFR AFRICAN AMERICAN: >60
GFR NON-AFRICAN AMERICAN: >60
GLUCOSE BLD-MCNC: 164 MG/DL (ref 70–99)
GLUCOSE URINE: NEGATIVE MG/DL
HCT VFR BLD CALC: 36.5 % (ref 36–48)
HEMOGLOBIN: 12 G/DL (ref 12–16)
KETONES, URINE: NEGATIVE MG/DL
LEUKOCYTE ESTERASE, URINE: NEGATIVE
LYMPHOCYTES ABSOLUTE: 2.3 K/UL (ref 1–5.1)
LYMPHOCYTES RELATIVE PERCENT: 43.2 %
MCH RBC QN AUTO: 29.9 PG (ref 26–34)
MCHC RBC AUTO-ENTMCNC: 33 G/DL (ref 31–36)
MCV RBC AUTO: 90.7 FL (ref 80–100)
MICROSCOPIC EXAMINATION: NORMAL
MONOCYTES ABSOLUTE: 0.3 K/UL (ref 0–1.3)
MONOCYTES RELATIVE PERCENT: 6.5 %
NEUTROPHILS ABSOLUTE: 2.5 K/UL (ref 1.7–7.7)
NEUTROPHILS RELATIVE PERCENT: 48.3 %
NITRITE, URINE: NEGATIVE
PDW BLD-RTO: 13.8 % (ref 12.4–15.4)
PH UA: 6 (ref 5–8)
PLATELET # BLD: 175 K/UL (ref 135–450)
PMV BLD AUTO: 9.1 FL (ref 5–10.5)
POTASSIUM REFLEX MAGNESIUM: 3.9 MMOL/L (ref 3.5–5.1)
PROTEIN UA: NEGATIVE MG/DL
RBC # BLD: 4.02 M/UL (ref 4–5.2)
SODIUM BLD-SCNC: 137 MMOL/L (ref 136–145)
SPECIFIC GRAVITY UA: >=1.03 (ref 1–1.03)
TOTAL PROTEIN: 7.4 G/DL (ref 6.4–8.2)
TROPONIN: <0.01 NG/ML
URINE TYPE: NORMAL
UROBILINOGEN, URINE: 1 E.U./DL
WBC # BLD: 5.2 K/UL (ref 4–11)

## 2022-08-31 PROCEDURE — 84484 ASSAY OF TROPONIN QUANT: CPT

## 2022-08-31 PROCEDURE — 99285 EMERGENCY DEPT VISIT HI MDM: CPT

## 2022-08-31 PROCEDURE — 85025 COMPLETE CBC W/AUTO DIFF WBC: CPT

## 2022-08-31 PROCEDURE — 85379 FIBRIN DEGRADATION QUANT: CPT

## 2022-08-31 PROCEDURE — 96374 THER/PROPH/DIAG INJ IV PUSH: CPT

## 2022-08-31 PROCEDURE — 93005 ELECTROCARDIOGRAM TRACING: CPT | Performed by: PHYSICIAN ASSISTANT

## 2022-08-31 PROCEDURE — 6370000000 HC RX 637 (ALT 250 FOR IP): Performed by: PHYSICIAN ASSISTANT

## 2022-08-31 PROCEDURE — 80053 COMPREHEN METABOLIC PANEL: CPT

## 2022-08-31 PROCEDURE — 81003 URINALYSIS AUTO W/O SCOPE: CPT

## 2022-08-31 PROCEDURE — 71046 X-RAY EXAM CHEST 2 VIEWS: CPT

## 2022-08-31 PROCEDURE — 6360000002 HC RX W HCPCS: Performed by: PHYSICIAN ASSISTANT

## 2022-08-31 RX ORDER — IPRATROPIUM BROMIDE AND ALBUTEROL SULFATE 2.5; .5 MG/3ML; MG/3ML
1 SOLUTION RESPIRATORY (INHALATION) ONCE
Status: DISCONTINUED | OUTPATIENT
Start: 2022-08-31 | End: 2022-08-31

## 2022-08-31 RX ORDER — METHYLPREDNISOLONE SODIUM SUCCINATE 40 MG/ML
40 INJECTION, POWDER, LYOPHILIZED, FOR SOLUTION INTRAMUSCULAR; INTRAVENOUS ONCE
Status: COMPLETED | OUTPATIENT
Start: 2022-08-31 | End: 2022-08-31

## 2022-08-31 RX ORDER — BENZONATATE 100 MG/1
100 CAPSULE ORAL 3 TIMES DAILY PRN
Qty: 30 CAPSULE | Refills: 0 | Status: SHIPPED | OUTPATIENT
Start: 2022-08-31 | End: 2022-09-07

## 2022-08-31 RX ORDER — METHYLPREDNISOLONE 4 MG/1
4 TABLET ORAL SEE ADMIN INSTRUCTIONS
Qty: 1 KIT | Refills: 0 | Status: SHIPPED | OUTPATIENT
Start: 2022-08-31 | End: 2022-09-06

## 2022-08-31 RX ORDER — METHYLPREDNISOLONE 4 MG/1
4 TABLET ORAL SEE ADMIN INSTRUCTIONS
Qty: 1 KIT | Refills: 0 | Status: SHIPPED | OUTPATIENT
Start: 2022-08-31 | End: 2022-08-31

## 2022-08-31 RX ORDER — IPRATROPIUM BROMIDE AND ALBUTEROL SULFATE 2.5; .5 MG/3ML; MG/3ML
1 SOLUTION RESPIRATORY (INHALATION)
Status: DISCONTINUED | OUTPATIENT
Start: 2022-08-31 | End: 2022-08-31

## 2022-08-31 RX ORDER — BENZONATATE 100 MG/1
100 CAPSULE ORAL 3 TIMES DAILY PRN
Qty: 30 CAPSULE | Refills: 0 | Status: SHIPPED | OUTPATIENT
Start: 2022-08-31 | End: 2022-08-31

## 2022-08-31 RX ADMIN — IPRATROPIUM BROMIDE AND ALBUTEROL SULFATE 1 AMPULE: .5; 3 SOLUTION RESPIRATORY (INHALATION) at 19:49

## 2022-08-31 RX ADMIN — METHYLPREDNISOLONE SODIUM SUCCINATE 40 MG: 40 INJECTION, POWDER, LYOPHILIZED, FOR SOLUTION INTRAMUSCULAR; INTRAVENOUS at 19:49

## 2022-08-31 ASSESSMENT — PAIN DESCRIPTION - ORIENTATION: ORIENTATION: RIGHT

## 2022-08-31 ASSESSMENT — PAIN - FUNCTIONAL ASSESSMENT: PAIN_FUNCTIONAL_ASSESSMENT: 0-10

## 2022-08-31 ASSESSMENT — PAIN DESCRIPTION - LOCATION: LOCATION: RIB CAGE

## 2022-08-31 ASSESSMENT — PAIN SCALES - GENERAL: PAINLEVEL_OUTOF10: 8

## 2022-08-31 ASSESSMENT — PAIN DESCRIPTION - DESCRIPTORS: DESCRIPTORS: ACHING

## 2022-08-31 ASSESSMENT — PAIN DESCRIPTION - PAIN TYPE: TYPE: ACUTE PAIN

## 2022-08-31 ASSESSMENT — PAIN DESCRIPTION - FREQUENCY: FREQUENCY: CONTINUOUS

## 2022-08-31 NOTE — ED PROVIDER NOTES
[facility] Emergency Department    CHIEF COMPLAINT  Nasal Congestion (Tested positive Saturday for Covid, now having chest pain from cough, lung pain, green sputum, congestion and fatigue.)      SHARED SERVICE VISIT  I have seen and evaluated this patient with my supervising physician, Dr. Samara Chavez. HISTORY OF PRESENT ILLNESS  Jeromy Ma is a 73 y.o. female who presents to the ED complaining of nasal congestion, chest pain from coughing, green sputum, production fatigue. Patient states she tested positive for COVID on Saturday and has been experiencing ongoing symptoms. Patient states she thought she would be over COVID at this point. Patient states that she thought she could come to the ED to receive antibiotics for COVID infection. Patient denies any headaches, bodies, fevers or chills. Patient denies any sore throat. Patient denies any shortness of breath or dyspnea. Patient denies any nausea, vomiting, diarrhea, or abdominal pain. Patient denies any urinary symptoms. Patient denies any new onset back pain. Patient denies any recent travels. No other complaints, modifying factors or associated symptoms. Nursing notes reviewed. Past Medical History:   Diagnosis Date    Anemia     Anxiety     Bleeding disorder (HCC)     chronic post gastric bypass surgery bleeding    CAD (coronary artery disease)     COPD (chronic obstructive pulmonary disease) (HCC)     Hernia of unspecified site of abdominal cavity without mention of obstruction or gangrene     Hernia of belly button.  Pt states it's more raised recently    Hypertension     Hypothyroidism     Obese     Osteoarthritis     Other disorders of kidney and ureter     Psoriasis      Past Surgical History:   Procedure Laterality Date    CARDIAC CATHETERIZATION  2012    Cors WNL     SECTION      CHOLECYSTECTOMY      ESOPHAGEAL DILATATION      GALLBLADDER SURGERY      GASTRIC BYPASS SURGERY PACEMAKER INSERTION  9/30/2013    generator replacement    PACEMAKER INSERTION  9/6/2006    initial implant    TOTAL KNEE ARTHROPLASTY       Family History   Problem Relation Age of Onset    Alcohol Abuse Mother     Depression Mother     Mental Illness Mother     Diabetes Father     Asthma Sister     Alcohol Abuse Sister     Other Sister         blood disease    Cancer Sister     Diabetes Sister     Alcohol Abuse Brother     Cancer Brother      Social History     Socioeconomic History    Marital status:      Spouse name: Not on file    Number of children: Not on file    Years of education: Not on file    Highest education level: Not on file   Occupational History    Not on file   Tobacco Use    Smoking status: Never    Smokeless tobacco: Never   Vaping Use    Vaping Use: Never used   Substance and Sexual Activity    Alcohol use: No    Drug use: No    Sexual activity: Yes     Partners: Male   Other Topics Concern    Not on file   Social History Narrative    Not on file     Social Determinants of Health     Financial Resource Strain: Not on file   Food Insecurity: Not on file   Transportation Needs: Not on file   Physical Activity: Not on file   Stress: Not on file   Social Connections: Not on file   Intimate Partner Violence: Not on file   Housing Stability: Not on file     Current Facility-Administered Medications   Medication Dose Route Frequency Provider Last Rate Last Admin    ipratropium-albuterol (DUONEB) nebulizer solution 1 ampule  1 ampule Inhalation Q4H WA Lizzette Roe PA-C        methylPREDNISolone sodium (SOLU-MEDROL) injection 40 mg  40 mg IntraVENous Once Lizzette Roe PA-C         Current Outpatient Medications   Medication Sig Dispense Refill    oxyCODONE-acetaminophen (PERCOCET) 5-325 MG per tablet Take 1 tablet by mouth every 4 hours as needed for Pain. Rx for 120 / month / Dr Lyndsay Buckner for arthritis.       Respiratory Therapy Supplies (NEBULIZER COMPRESSOR) KIT 1 kit by Does not apply route once for 1 dose 1 kit 0    Respiratory Therapy Supplies (NEBULIZER/TUBING/MOUTHPIECE) KIT 1 kit by Does not apply route daily as needed (wheezing) 1 kit 0    albuterol (PROVENTIL) (2.5 MG/3ML) 0.083% nebulizer solution Take 3 mLs by nebulization every 4 hours as needed for Wheezing 50 each 0    Spacer/Aero-Holding Chambers (E-Z SPACER) ANA 1 Device by Does not apply route daily as needed (wheezing) 1 Device 0    loratadine (CLARITIN) 10 MG tablet Take 10 mg by mouth      Lancets Thin MISC Test 1 time daily. busPIRone (BUSPAR) 15 MG tablet Take by mouth      Blood Glucose Monitoring Suppl (BLOOD GLUCOSE MONITOR SYSTEM) w/Device KIT To check fsbs daily      Blood Gluc Meter Disp-Strips (BLOOD GLUCOSE METER DISPOSABLE) ANA To check fsbs daily      ferrous sulfate 325 (65 FE) MG tablet Take 325 mg by mouth every other day Every other day      Disposable Gloves (LATEX GLOVES LARGE) MISC 1 Package by Does not apply route as needed. 2 each 0    carvedilol (COREG) 12.5 MG tablet Take 1 tablet by mouth 2 times daily (with meals). (Patient taking differently: Take 6.25 mg by mouth 2 times daily ) 180 tablet 3    furosemide (LASIX) 20 MG tablet Take 1 tablet by mouth daily. (Patient taking differently: Take 20 mg by mouth as needed ) 90 tablet 3    diltiazem (DILACOR XR) 240 MG ER capsule Take 1 capsule by mouth daily. 90 capsule 3    aspirin EC 81 MG EC tablet Take 1 tablet by mouth 2 times daily. 180 tablet 1    albuterol (PROVENTIL HFA) 108 (90 BASE) MCG/ACT inhaler Inhale 2 puffs into the lungs every 6 hours as needed for Wheezing or Shortness of Breath. 1 Inhaler 2    levothyroxine (SYNTHROID) 200 MCG tablet Take 150 mcg by mouth daily. nitroGLYCERIN (NITROLINGUAL) 0.4 MG/SPRAY spray Place 1 spray under the tongue every 5 minutes as needed.        Allergies   Allergen Reactions    Demerol Shortness Of Breath    Demerol Hcl [Meperidine] Other (See Comments)     Stops my breathing         REVIEW OF SYSTEMS  10 systems reviewed, pertinent positives per HPI otherwise noted to be negative    PHYSICAL EXAM  /76   Pulse 67   Temp 98.7 °F (37.1 °C) (Oral)   Resp 18   Ht 5' 2\" (1.575 m)   Wt 260 lb (117.9 kg)   SpO2 97%   BMI 47.55 kg/m²   GENERAL APPEARANCE: Awake and alert. Cooperative. No acute distress. Nontoxic appearance  HEAD: Normocephalic. Atraumatic. No segura signs or raccoon eyes. EYES: EOM's grossly intact. No conjunctival injection or discharge  ENT: Mucous membranes are pink and moist.   NECK: Supple. Full range of motion. No nuchal rigidity. No tracheal tenderness or deviation. No stridor. HEART: RRR. No murmurs, rubs or gallops. Normal S1-S2. No S3 or S4.  LUNGS: Respirations unlabored. CTAB. Good air exchange. Speaking comfortably in full sentences. ABDOMEN: Soft. Non-distended. Non-tender. No guarding or rebound. No masses. No organomegaly. EXTREMITIES: No peripheral edema. Moves all extremities equally. All extremities neurovascularly intact. SKIN: Warm and dry. No acute rashes. NEUROLOGICAL: Alert and oriented. CN's 2-12 intact. No gross facial drooping. Strength 5/5, sensation intact. PSYCHIATRIC: Normal mood and affect. RADIOLOGY  No results found. ED COURSE  70-year-old female presents to the ED for evaluation of congestion, coughing, green sputum production and fatigue. She tested positive for COVID-19 on Saturday. Troponin was negative. D-dimer was negative. No abnormalities noted on UA. No abnormalities seen on CBC. She is hyperglycemic with glucose 164. No other abnormalities noted on CMP. Chest x-ray shows no acute cardiopulmonary findings. EKG interpreted by Dr. Alex Hunter shows nonspecific ST and T wave abnormalities. Patient received Solu-Medrol. Triage vitals /76, pulse of 67, respirations 18, temperature 98.7 °F, SPO2 97% on room air. Vital signs remained stable during course of ED stay.   Patient was walked to the ED with no desaturation of O2. Risk management discussed and shared decision making had with patient and/or surrogate. All questions were answered. Patient will follow up with primary care provider for further evaluation/treatment. All questions answered. Patient will return to ED for new/worsening symptoms. Patient was sent home with a prescription for Medrol Dosepak and Tessalon Perles. CRITICAL CARE TIME  0 minutes of critical care time spent not including separately billable procedures.     MDM  Results for orders placed or performed during the hospital encounter of 08/31/22   CBC with Auto Differential   Result Value Ref Range    WBC 5.2 4.0 - 11.0 K/uL    RBC 4.02 4.00 - 5.20 M/uL    Hemoglobin 12.0 12.0 - 16.0 g/dL    Hematocrit 36.5 36.0 - 48.0 %    MCV 90.7 80.0 - 100.0 fL    MCH 29.9 26.0 - 34.0 pg    MCHC 33.0 31.0 - 36.0 g/dL    RDW 13.8 12.4 - 15.4 %    Platelets 825 692 - 423 K/uL    MPV 9.1 5.0 - 10.5 fL    Neutrophils % 48.3 %    Lymphocytes % 43.2 %    Monocytes % 6.5 %    Eosinophils % 1.8 %    Basophils % 0.2 %    Neutrophils Absolute 2.5 1.7 - 7.7 K/uL    Lymphocytes Absolute 2.3 1.0 - 5.1 K/uL    Monocytes Absolute 0.3 0.0 - 1.3 K/uL    Eosinophils Absolute 0.1 0.0 - 0.6 K/uL    Basophils Absolute 0.0 0.0 - 0.2 K/uL   Comprehensive Metabolic Panel w/ Reflex to MG   Result Value Ref Range    Sodium 137 136 - 145 mmol/L    Potassium reflex Magnesium 3.9 3.5 - 5.1 mmol/L    Chloride 103 99 - 110 mmol/L    CO2 22 21 - 32 mmol/L    Anion Gap 12 3 - 16    Glucose 164 (H) 70 - 99 mg/dL    BUN 14 7 - 20 mg/dL    Creatinine 0.9 0.6 - 1.2 mg/dL    GFR Non-African American >60 >60    GFR African American >60 >60    Calcium 8.9 8.3 - 10.6 mg/dL    Total Protein 7.4 6.4 - 8.2 g/dL    Albumin 4.2 3.4 - 5.0 g/dL    Albumin/Globulin Ratio 1.3 1.1 - 2.2    Total Bilirubin 0.9 0.0 - 1.0 mg/dL    Alkaline Phosphatase 80 40 - 129 U/L    ALT 17 10 - 40 U/L    AST 22 15 - 37 U/L   Urinalysis   Result Value Ref Range    Color, UA Yellow Straw/Yellow    Clarity, UA Clear Clear    Glucose, Ur Negative Negative mg/dL    Bilirubin Urine Negative Negative    Ketones, Urine Negative Negative mg/dL    Specific Gravity, UA >=1.030 1.005 - 1.030    Blood, Urine Negative Negative    pH, UA 6.0 5.0 - 8.0    Protein, UA Negative Negative mg/dL    Urobilinogen, Urine 1.0 <2.0 E.U./dL    Nitrite, Urine Negative Negative    Leukocyte Esterase, Urine Negative Negative    Microscopic Examination Not Indicated     Urine Type NotGiven    Troponin   Result Value Ref Range    Troponin <0.01 <0.01 ng/mL   D-Dimer, Quantitative   Result Value Ref Range    D-Dimer, Quant 0.34 0.00 - 0.60 ug/mL FEU   EKG 12 Lead   Result Value Ref Range    Ventricular Rate 68 BPM    Atrial Rate 75 BPM    QRS Duration 88 ms    Q-T Interval 454 ms    QTc Calculation (Bazett) 482 ms    R Axis 15 degrees    T Axis 3 degrees    Diagnosis       Atrial-paced rhythmNonspecific ST and T wave abnormalityAbnormal ECGWhen compared with ECG of 03-JUN-2022 13:37,Nonspecific T wave abnormality now evident in Anterior leadsConfirmed by [facility] ([id number]) on 9/1/2022 10:51:34 AM       I estimate there is LOW risk for PULMONARY EMBOLISM, ACUTE CORONARY SYNDROME, OR THORACIC AORTIC DISSECTION, thus I consider the discharge disposition reasonable. Mery Huynh and I have discussed the diagnosis and risks, and we agree with discharging home to follow-up with their primary doctor. We also discussed returning to the Emergency Department immediately if new or worsening symptoms occur. We have discussed the symptoms which are most concerning (e.g., bloody sputum, fever, worsening pain or shortness of breath, vomiting) that necessitate immediate return. FINAL Impression    1. COVID-19    2. Cough        Blood pressure (!) 140/80, pulse 74, temperature 98.7 °F (37.1 °C), temperature source Oral, resp.  rate 19, height 5' 2\" (1.575 m), weight 260 lb (117.9 kg), SpO2 98 %, not currently breastfeeding. DISPOSITION  Patient was discharged to home in good condition.          Fei Anthony PA-C  09/07/22 5320

## 2022-08-31 NOTE — ED NOTES
Ambulated pt. Spo2 97-98% during ambulation, with a heart rate in the 90s during entire walk.       Eligio Wood RN  08/31/22 3552

## 2022-09-01 LAB
EKG ATRIAL RATE: 75 BPM
EKG DIAGNOSIS: NORMAL
EKG Q-T INTERVAL: 454 MS
EKG QRS DURATION: 88 MS
EKG QTC CALCULATION (BAZETT): 482 MS
EKG R AXIS: 15 DEGREES
EKG T AXIS: 3 DEGREES
EKG VENTRICULAR RATE: 68 BPM

## 2022-09-01 PROCEDURE — 93010 ELECTROCARDIOGRAM REPORT: CPT | Performed by: INTERNAL MEDICINE

## 2022-09-01 NOTE — ED PROVIDER NOTES
I personally saw the patient and performed a substantive portion of the visit including all aspects of the medical decision making. Atrial paced rhythm, rate 68, normal axis, QTC prolonged at 482, appears unchanged from prior on Brianne 3, 2022    Patient here with COVID-19. Has been ill for roughly a week. Reports cough and pleuritic discomfort. Work-up unremarkable. Patient has good oxygen saturations, negative D-dimer, well-appearing on reevaluation. Plan for prescriptions, close outpatient follow-up versus return with any concerns. For other details regarding this encounter please see Viviane MATTA's documentation.        Luisa Polo MD  08/31/22 2127

## 2022-09-01 NOTE — ED NOTES
Saline lock removed intact. IV site looked unremarkable. Pressure dressing applied. Discharge instructions reviewed with Ms. Ifrah Acosta. She verbalized understanding. Copy of discharge instructions and prescriptions given. Ms. Ifrah Acosta was discharged to home in good condition per personal vehicle, friend/family driving. She exited the ED without difficulty.           Misha Valencia RN  08/31/22 3651

## 2023-03-08 LAB
BILIRUBIN URINE: NEGATIVE
BLOOD, URINE: NEGATIVE
CLARITY: CLEAR
COLOR: YELLOW
GLUCOSE URINE: NEGATIVE MG/DL
KETONES, URINE: NEGATIVE MG/DL
LEUKOCYTE ESTERASE, URINE: NEGATIVE
MICROSCOPIC EXAMINATION: ABNORMAL
NITRITE, URINE: NEGATIVE
PH UA: 6.5 (ref 5–8)
PROTEIN UA: NEGATIVE MG/DL
SPECIFIC GRAVITY UA: 1.02 (ref 1–1.03)
URINE REFLEX TO CULTURE: ABNORMAL
URINE TYPE: ABNORMAL
UROBILINOGEN, URINE: 2 E.U./DL

## 2023-03-10 LAB — URINE CULTURE, ROUTINE: NORMAL

## 2023-09-20 ENCOUNTER — APPOINTMENT (OUTPATIENT)
Dept: CT IMAGING | Age: 73
End: 2023-09-20
Payer: MEDICARE

## 2023-09-20 ENCOUNTER — HOSPITAL ENCOUNTER (EMERGENCY)
Age: 73
Discharge: HOME | End: 2023-09-20
Attending: STUDENT IN AN ORGANIZED HEALTH CARE EDUCATION/TRAINING PROGRAM
Payer: MEDICARE

## 2023-09-20 ENCOUNTER — APPOINTMENT (OUTPATIENT)
Dept: GENERAL RADIOLOGY | Age: 73
End: 2023-09-20
Payer: MEDICARE

## 2023-09-20 VITALS
DIASTOLIC BLOOD PRESSURE: 62 MMHG | RESPIRATION RATE: 18 BRPM | WEIGHT: 255 LBS | SYSTOLIC BLOOD PRESSURE: 124 MMHG | BODY MASS INDEX: 46.93 KG/M2 | OXYGEN SATURATION: 99 % | HEIGHT: 62 IN | TEMPERATURE: 97.8 F | HEART RATE: 62 BPM

## 2023-09-20 DIAGNOSIS — Z95.0 CARDIAC PACEMAKER IN SITU: ICD-10-CM

## 2023-09-20 DIAGNOSIS — R00.2 PALPITATIONS: Primary | ICD-10-CM

## 2023-09-20 DIAGNOSIS — R07.9 CHEST PAIN, UNSPECIFIED TYPE: ICD-10-CM

## 2023-09-20 DIAGNOSIS — N83.209 SIMPLE OVARIAN CYST: ICD-10-CM

## 2023-09-20 DIAGNOSIS — R06.02 SHORTNESS OF BREATH: ICD-10-CM

## 2023-09-20 LAB
ALBUMIN SERPL-MCNC: 4 G/DL (ref 3.4–5)
ALBUMIN/GLOB SERPL: 1.7 {RATIO} (ref 1.1–2.2)
ALP SERPL-CCNC: 73 U/L (ref 40–129)
ALT SERPL-CCNC: 15 U/L (ref 10–40)
ANION GAP SERPL CALCULATED.3IONS-SCNC: 11 MMOL/L (ref 3–16)
AST SERPL-CCNC: 17 U/L (ref 15–37)
BASE EXCESS BLDV CALC-SCNC: 1.5 MMOL/L (ref -3–3)
BASOPHILS # BLD: 0 K/UL (ref 0–0.2)
BASOPHILS NFR BLD: 0.2 %
BILIRUB SERPL-MCNC: 0.6 MG/DL (ref 0–1)
BUN SERPL-MCNC: 13 MG/DL (ref 7–20)
CALCIUM SERPL-MCNC: 8.2 MG/DL (ref 8.3–10.6)
CHLORIDE SERPL-SCNC: 107 MMOL/L (ref 99–110)
CO2 BLDV-SCNC: 28 MMOL/L
CO2 SERPL-SCNC: 25 MMOL/L (ref 21–32)
COHGB MFR BLDV: 1.1 % (ref 0–1.5)
CREAT SERPL-MCNC: <0.5 MG/DL (ref 0.6–1.2)
DEPRECATED RDW RBC AUTO: 13.7 % (ref 12.4–15.4)
EKG ATRIAL RATE: 75 BPM
EKG DIAGNOSIS: NORMAL
EKG Q-T INTERVAL: 472 MS
EKG QRS DURATION: 84 MS
EKG QTC CALCULATION (BAZETT): 486 MS
EKG R AXIS: 17 DEGREES
EKG T AXIS: 6 DEGREES
EKG VENTRICULAR RATE: 64 BPM
EOSINOPHIL # BLD: 0 K/UL (ref 0–0.6)
EOSINOPHIL NFR BLD: 0.4 %
GFR SERPLBLD CREATININE-BSD FMLA CKD-EPI: >60 ML/MIN/{1.73_M2}
GLUCOSE SERPL-MCNC: 131 MG/DL (ref 70–99)
HCO3 BLDV-SCNC: 26.4 MMOL/L (ref 23–29)
HCT VFR BLD AUTO: 35.3 % (ref 36–48)
HGB BLD-MCNC: 12.1 G/DL (ref 12–16)
INR PPP: 1.11 (ref 0.84–1.16)
LIPASE SERPL-CCNC: 37 U/L (ref 13–60)
LYMPHOCYTES # BLD: 2.2 K/UL (ref 1–5.1)
LYMPHOCYTES NFR BLD: 23.4 %
MAGNESIUM SERPL-MCNC: 2.2 MG/DL (ref 1.8–2.4)
MCH RBC QN AUTO: 31.5 PG (ref 26–34)
MCHC RBC AUTO-ENTMCNC: 34.2 G/DL (ref 31–36)
MCV RBC AUTO: 92.1 FL (ref 80–100)
METHGB MFR BLDV: 0 %
MONOCYTES # BLD: 0.6 K/UL (ref 0–1.3)
MONOCYTES NFR BLD: 6.6 %
NEUTROPHILS # BLD: 6.6 K/UL (ref 1.7–7.7)
NEUTROPHILS NFR BLD: 69.4 %
NT-PROBNP SERPL-MCNC: 495 PG/ML (ref 0–124)
O2 THERAPY: ABNORMAL
PCO2 BLDV: 43 MMHG (ref 40–50)
PH BLDV: 7.41 [PH] (ref 7.35–7.45)
PLATELET # BLD AUTO: 208 K/UL (ref 135–450)
PMV BLD AUTO: 8.9 FL (ref 5–10.5)
PO2 BLDV: 47.2 MMHG (ref 25–40)
POTASSIUM SERPL-SCNC: 3.4 MMOL/L (ref 3.5–5.1)
PROT SERPL-MCNC: 6.4 G/DL (ref 6.4–8.2)
PROTHROMBIN TIME: 14.3 SEC (ref 11.5–14.8)
RBC # BLD AUTO: 3.83 M/UL (ref 4–5.2)
SAO2 % BLDV: 83 %
SODIUM SERPL-SCNC: 143 MMOL/L (ref 136–145)
TROPONIN, HIGH SENSITIVITY: 9 NG/L (ref 0–14)
TROPONIN, HIGH SENSITIVITY: 9 NG/L (ref 0–14)
TSH SERPL DL<=0.005 MIU/L-ACNC: 0.89 UIU/ML (ref 0.27–4.2)
WBC # BLD AUTO: 9.5 K/UL (ref 4–11)

## 2023-09-20 PROCEDURE — 36415 COLL VENOUS BLD VENIPUNCTURE: CPT

## 2023-09-20 PROCEDURE — 6370000000 HC RX 637 (ALT 250 FOR IP): Performed by: STUDENT IN AN ORGANIZED HEALTH CARE EDUCATION/TRAINING PROGRAM

## 2023-09-20 PROCEDURE — 6360000004 HC RX CONTRAST MEDICATION: Performed by: STUDENT IN AN ORGANIZED HEALTH CARE EDUCATION/TRAINING PROGRAM

## 2023-09-20 PROCEDURE — 83690 ASSAY OF LIPASE: CPT

## 2023-09-20 PROCEDURE — 6360000002 HC RX W HCPCS: Performed by: STUDENT IN AN ORGANIZED HEALTH CARE EDUCATION/TRAINING PROGRAM

## 2023-09-20 PROCEDURE — 82803 BLOOD GASES ANY COMBINATION: CPT

## 2023-09-20 PROCEDURE — 93005 ELECTROCARDIOGRAM TRACING: CPT | Performed by: STUDENT IN AN ORGANIZED HEALTH CARE EDUCATION/TRAINING PROGRAM

## 2023-09-20 PROCEDURE — 85025 COMPLETE CBC W/AUTO DIFF WBC: CPT

## 2023-09-20 PROCEDURE — 93010 ELECTROCARDIOGRAM REPORT: CPT | Performed by: INTERNAL MEDICINE

## 2023-09-20 PROCEDURE — 84484 ASSAY OF TROPONIN QUANT: CPT

## 2023-09-20 PROCEDURE — 83880 ASSAY OF NATRIURETIC PEPTIDE: CPT

## 2023-09-20 PROCEDURE — 84443 ASSAY THYROID STIM HORMONE: CPT

## 2023-09-20 PROCEDURE — 83735 ASSAY OF MAGNESIUM: CPT

## 2023-09-20 PROCEDURE — 96374 THER/PROPH/DIAG INJ IV PUSH: CPT

## 2023-09-20 PROCEDURE — 99285 EMERGENCY DEPT VISIT HI MDM: CPT

## 2023-09-20 PROCEDURE — 80053 COMPREHEN METABOLIC PANEL: CPT

## 2023-09-20 PROCEDURE — 71045 X-RAY EXAM CHEST 1 VIEW: CPT

## 2023-09-20 PROCEDURE — 71260 CT THORAX DX C+: CPT

## 2023-09-20 PROCEDURE — 74177 CT ABD & PELVIS W/CONTRAST: CPT

## 2023-09-20 PROCEDURE — 85610 PROTHROMBIN TIME: CPT

## 2023-09-20 RX ORDER — ASPIRIN 325 MG
325 TABLET ORAL ONCE
Status: COMPLETED | OUTPATIENT
Start: 2023-09-20 | End: 2023-09-20

## 2023-09-20 RX ORDER — ONDANSETRON 2 MG/ML
4 INJECTION INTRAMUSCULAR; INTRAVENOUS ONCE
Status: COMPLETED | OUTPATIENT
Start: 2023-09-20 | End: 2023-09-20

## 2023-09-20 RX ADMIN — NITROGLYCERIN 0.5 INCH: 20 OINTMENT TOPICAL at 04:03

## 2023-09-20 RX ADMIN — IOPAMIDOL 75 ML: 755 INJECTION, SOLUTION INTRAVENOUS at 04:48

## 2023-09-20 RX ADMIN — ONDANSETRON 4 MG: 2 INJECTION INTRAMUSCULAR; INTRAVENOUS at 04:02

## 2023-09-20 RX ADMIN — ASPIRIN 325 MG: 325 TABLET ORAL at 04:03

## 2023-09-20 ASSESSMENT — LIFESTYLE VARIABLES
HOW OFTEN DO YOU HAVE A DRINK CONTAINING ALCOHOL: NEVER
HOW MANY STANDARD DRINKS CONTAINING ALCOHOL DO YOU HAVE ON A TYPICAL DAY: PATIENT DOES NOT DRINK

## 2023-09-20 ASSESSMENT — PAIN SCALES - GENERAL
PAINLEVEL_OUTOF10: 3
PAINLEVEL_OUTOF10: 5

## 2023-09-20 ASSESSMENT — PAIN - FUNCTIONAL ASSESSMENT
PAIN_FUNCTIONAL_ASSESSMENT: 0-10
PAIN_FUNCTIONAL_ASSESSMENT: 0-10

## 2023-09-20 NOTE — ED PROVIDER NOTES
Normocephalic and atraumatic. Right Ear: External ear normal.      Left Ear: External ear normal.      Nose: Nose normal.      Mouth/Throat:      Pharynx: Oropharynx is clear. Eyes:      Conjunctiva/sclera: Conjunctivae normal.   Cardiovascular:      Rate and Rhythm: Normal rate and regular rhythm. Heart sounds: No friction rub. No gallop. Pulmonary:      Effort: Pulmonary effort is normal. No respiratory distress. Breath sounds: Normal breath sounds. No wheezing. Abdominal:      General: There is no distension. Palpations: Abdomen is soft. Tenderness: There is no abdominal tenderness. There is no right CVA tenderness, left CVA tenderness, guarding or rebound. Musculoskeletal:         General: Normal range of motion. Cervical back: Normal range of motion and neck supple. Right lower leg: No edema. Left lower leg: No edema. Skin:     General: Skin is warm. Capillary Refill: Capillary refill takes less than 2 seconds. Neurological:      General: No focal deficit present. Mental Status: She is alert. Cranial Nerves: No cranial nerve deficit.              MDM/ED Course  ED Medication Orders (From admission, onward)      Start Ordered     Status Ordering Provider    09/20/23 4041 09/20/23 0432  iopamidol (ISOVUE-370) 76 % injection 75 mL  IMG ONCE PRN         Last MAR action: Given - by Guille Lama on 09/20/23 at [address], [address]    09/20/23 0400 09/20/23 0349  aspirin tablet 325 mg  ONCE         Last MAR action: Given - by Maryann Lyon on 09/20/23 at [address] [facility]    09/20/23 0400 09/20/23 0349  nitroglycerin (NITRO-BID) 2 % ointment 0.5 inch  ONCE         Last MAR action: Given - by Maryann Lyon on 09/20/23 at [address], [address]    09/20/23 0400 09/20/23 0349  ondansetron (ZOFRAN) injection 4 mg  ONCE         Last MAR action: Given - by Maryann Lyon on 09/20/23 at [address], [address]

## 2023-09-20 NOTE — ED NOTES
[id number] - Call placed to [facility] for Cardiology consult at [facility]  09/20/23 211 [facility] returned the page with Dr. Azul Villaseñor on the line who spoke directly with Dr. Harsh Osborn at this time     [facility]  09/20/23 70 [facility] to place Vascular Surgery consult     [facility]  09/20/23 5568

## 2023-09-20 NOTE — ED NOTES
26 AC called back to see if vascular had called back, informed Reina JACKSON that they had not.  Will be paging vascular again      Ruthy Turner  09/20/23 Melany  09/20/23 21  Call placed to [facility] spoke to [facility], pt now wants to leave cancel vascular consult      Ruthy Turner  09/20/23 8682

## 2023-09-20 NOTE — ED NOTES
This RN interrogate patient's [employer] pacemaker. MD notified of pending result from company.      Baltazar Barrios, MANUEL  09/20/23 1709

## 2023-10-27 ENCOUNTER — HOSPITAL ENCOUNTER (EMERGENCY)
Age: 73
Discharge: HOME | End: 2023-10-27
Payer: MEDICARE

## 2023-10-27 ENCOUNTER — APPOINTMENT (OUTPATIENT)
Dept: GENERAL RADIOLOGY | Age: 73
End: 2023-10-27
Payer: MEDICARE

## 2023-10-27 VITALS
WEIGHT: 245 LBS | TEMPERATURE: 98.4 F | BODY MASS INDEX: 45.08 KG/M2 | HEART RATE: 63 BPM | DIASTOLIC BLOOD PRESSURE: 79 MMHG | RESPIRATION RATE: 18 BRPM | SYSTOLIC BLOOD PRESSURE: 146 MMHG | OXYGEN SATURATION: 98 % | HEIGHT: 62 IN

## 2023-10-27 DIAGNOSIS — M79.601 RIGHT ARM PAIN: Primary | ICD-10-CM

## 2023-10-27 DIAGNOSIS — S20.01XA CONTUSION OF RIGHT BREAST, INITIAL ENCOUNTER: ICD-10-CM

## 2023-10-27 PROCEDURE — 73130 X-RAY EXAM OF HAND: CPT

## 2023-10-27 PROCEDURE — 6370000000 HC RX 637 (ALT 250 FOR IP): Performed by: PHYSICIAN ASSISTANT

## 2023-10-27 PROCEDURE — 99283 EMERGENCY DEPT VISIT LOW MDM: CPT

## 2023-10-27 PROCEDURE — 73090 X-RAY EXAM OF FOREARM: CPT

## 2023-10-27 RX ORDER — ACETAMINOPHEN 500 MG
1000 TABLET ORAL ONCE
Status: COMPLETED | OUTPATIENT
Start: 2023-10-27 | End: 2023-10-27

## 2023-10-27 RX ADMIN — ACETAMINOPHEN 1000 MG: 500 TABLET ORAL at 14:56

## 2023-10-27 ASSESSMENT — PAIN SCALES - GENERAL
PAINLEVEL_OUTOF10: 6
PAINLEVEL_OUTOF10: 10

## 2023-10-27 ASSESSMENT — PAIN - FUNCTIONAL ASSESSMENT: PAIN_FUNCTIONAL_ASSESSMENT: 0-10

## 2023-10-27 ASSESSMENT — PAIN DESCRIPTION - ONSET: ONSET: GRADUAL

## 2023-10-27 ASSESSMENT — PAIN DESCRIPTION - FREQUENCY: FREQUENCY: CONTINUOUS

## 2023-10-27 ASSESSMENT — PAIN DESCRIPTION - PAIN TYPE: TYPE: ACUTE PAIN

## 2023-10-27 ASSESSMENT — PAIN DESCRIPTION - LOCATION: LOCATION: HAND

## 2023-10-27 ASSESSMENT — PAIN DESCRIPTION - DESCRIPTORS: DESCRIPTORS: ACHING

## 2023-10-27 NOTE — ED PROVIDER NOTES
family for help. Case management did see the patient here and provided her with some resources and safe houses in case she needed them. She denies suicidal homicidal ideation. She is distally neurovascular intact. Also had some bruising over her right breast but states this is improving and did not believe she needed an x-ray and she is not short of breath or having any pleuritic pain and have low suspicion for pneumothorax or multiple rib fractures. She will follow-up as an outpatient return here for any worsening of symptoms or problems at home. Disposition Considerations (tests considered but not done, Admit vs D/C, Shared Decision Making, Pt Expectation of Test or Tx.):        I am the Primary Clinician of Record. FINAL IMPRESSION      1. Right arm pain    2.  Contusion of right breast, initial encounter          DISPOSITION/PLAN     DISPOSITION Decision To Discharge 10/27/2023 03:15:35 PM      PATIENT REFERRED TO:  Steven Diaz MD  [address] [place] [address]  [phone]    Schedule an appointment as soon as possible for a visit in 1 week  For re-check    [facility]  [address] [place] [address]  [phone]    As needed      DISCHARGE MEDICATIONS:  Discharge Medication List as of 10/27/2023  3:22 PM          DISCONTINUED MEDICATIONS:  Discharge Medication List as of 10/27/2023  3:22 PM                 (Please note that portions of this note were completed with a voice recognition program.  Efforts were made to edit the dictations but occasionally words are mis-transcribed.)    Jana Smith PA-C (electronically signed)        Jana Smith PA-C  10/27/23 3250

## 2023-10-27 NOTE — ED NOTES
1516- Completed OCL thumb spica. Explained to pt that this splint is temporary and cannot get wet. Pt verbalized understanding what to look for if the cast gets to tight. Explained to pt to remove splint if she loses any feeling in fingers or experience any color changes in the fingers.  Pt verbalized understanding     Yadiel Casey  10/27/23 1512

## 2024-02-26 ASSESSMENT — EJECTION FRACTION: EF_VALUE: 69

## 2024-02-27 ENCOUNTER — HOSPITAL ENCOUNTER (OUTPATIENT)
Dept: CARDIAC REHAB | Age: 74
Setting detail: THERAPIES SERIES
Discharge: HOME | End: 2024-02-27
Payer: MEDICARE

## 2024-02-27 VITALS — HEIGHT: 62 IN | BODY MASS INDEX: 44.16 KG/M2 | WEIGHT: 240 LBS

## 2024-02-27 PROCEDURE — 93798 PHYS/QHP OP CAR RHAB W/ECG: CPT

## 2024-02-27 ASSESSMENT — PATIENT HEALTH QUESTIONNAIRE - PHQ9
1. LITTLE INTEREST OR PLEASURE IN DOING THINGS: 3
SUM OF ALL RESPONSES TO PHQ QUESTIONS 1-9: 8
SUM OF ALL RESPONSES TO PHQ QUESTIONS 1-9: 8
2. FEELING DOWN, DEPRESSED OR HOPELESS: 0
7. TROUBLE CONCENTRATING ON THINGS, SUCH AS READING THE NEWSPAPER OR WATCHING TELEVISION: 0
SUM OF ALL RESPONSES TO PHQ9 QUESTIONS 1 & 2: 3
SUM OF ALL RESPONSES TO PHQ QUESTIONS 1-9: 8
5. POOR APPETITE OR OVEREATING: 0
3. TROUBLE FALLING OR STAYING ASLEEP: 2
6. FEELING BAD ABOUT YOURSELF - OR THAT YOU ARE A FAILURE OR HAVE LET YOURSELF OR YOUR FAMILY DOWN: 0
SUM OF ALL RESPONSES TO PHQ QUESTIONS 1-9: 8
8. MOVING OR SPEAKING SO SLOWLY THAT OTHER PEOPLE COULD HAVE NOTICED. OR THE OPPOSITE, BEING SO FIGETY OR RESTLESS THAT YOU HAVE BEEN MOVING AROUND A LOT MORE THAN USUAL: 0
10. IF YOU CHECKED OFF ANY PROBLEMS, HOW DIFFICULT HAVE THESE PROBLEMS MADE IT FOR YOU TO DO YOUR WORK, TAKE CARE OF THINGS AT HOME, OR GET ALONG WITH OTHER PEOPLE: 1
9. THOUGHTS THAT YOU WOULD BE BETTER OFF DEAD, OR OF HURTING YOURSELF: 0
4. FEELING TIRED OR HAVING LITTLE ENERGY: 3

## 2024-02-27 ASSESSMENT — EXERCISE STRESS TEST
PEAK_BP: 142/60
PEAK_HR: 101

## 2024-02-27 NOTE — PLAN OF CARE
Prescribed: No  BB Prescribed: Yes  Education Target Goals  Target Goals: Understand target guidelines for lipids; Risk factors    Staff Treatment Goals  Goals: Oxygen; Exercise; Functional capacity; Blood pressure; Psychosocial; Tobacco; Nutrition; Lipids; Weight management; Medications; Education  Exercise Goal: Understand appropriate RPE levels.  Exercise Goal Status: Initial  Exercise Goal Assessment: Problems/barriers  Blood Pressure Goal: Identify the benefits of exercise in the treatment of hypertension.  Blood Pressure Goal Status: Initial  Psychosocial Goal: Identify what stress/depression is & discussion of stressors (both positive and negative).  Psychsocial Goal Status: Initial  Psychosocial Goal Assessment: Key functional changes  Nutrition Goal: Discuss label interpretation and begin reading nutrition labels while shopping.  Nutrition Goal Status: Initial  Nutrition Goal Assessment: Recommendations  Education Goal: Identify risk factors in the development of C.A.D.  Education Goal Status: Initial              Provider Review and Approval of this ITP    The above treatment plan and goals have been set for your patient during Cardiac Rehabilitation. Please review and Electronically Cosign        Electronically signed by Kaitlin Park RN on 2/27/24 at 10:13 AM EST

## 2024-02-28 ENCOUNTER — HOSPITAL ENCOUNTER (OUTPATIENT)
Dept: CARDIAC REHAB | Age: 74
Setting detail: THERAPIES SERIES
Discharge: HOME | End: 2024-02-28
Payer: MEDICARE

## 2024-02-28 VITALS — BODY MASS INDEX: 43.9 KG/M2 | WEIGHT: 240 LBS

## 2024-02-28 PROCEDURE — 93798 PHYS/QHP OP CAR RHAB W/ECG: CPT

## 2024-03-01 ENCOUNTER — HOSPITAL ENCOUNTER (OUTPATIENT)
Dept: CARDIAC REHAB | Age: 74
Setting detail: THERAPIES SERIES
Discharge: HOME | End: 2024-03-01
Payer: MEDICARE

## 2024-03-01 VITALS — HEIGHT: 62 IN | WEIGHT: 241.2 LBS | BODY MASS INDEX: 44.39 KG/M2

## 2024-03-01 PROCEDURE — 93798 PHYS/QHP OP CAR RHAB W/ECG: CPT

## 2024-03-04 ENCOUNTER — HOSPITAL ENCOUNTER (OUTPATIENT)
Dept: CARDIAC REHAB | Age: 74
Setting detail: THERAPIES SERIES
Discharge: HOME | End: 2024-03-04
Payer: MEDICARE

## 2024-03-04 VITALS — HEIGHT: 62 IN | WEIGHT: 241 LBS | BODY MASS INDEX: 44.35 KG/M2

## 2024-03-04 PROCEDURE — 93798 PHYS/QHP OP CAR RHAB W/ECG: CPT

## 2024-03-06 ENCOUNTER — HOSPITAL ENCOUNTER (OUTPATIENT)
Dept: CARDIAC REHAB | Age: 74
Setting detail: THERAPIES SERIES
Discharge: HOME | End: 2024-03-06
Payer: MEDICARE

## 2024-03-06 VITALS — BODY MASS INDEX: 44.08 KG/M2 | WEIGHT: 241 LBS

## 2024-03-06 PROCEDURE — 93798 PHYS/QHP OP CAR RHAB W/ECG: CPT

## 2024-03-08 ENCOUNTER — HOSPITAL ENCOUNTER (OUTPATIENT)
Dept: CARDIAC REHAB | Age: 74
Setting detail: THERAPIES SERIES
Discharge: HOME | End: 2024-03-08
Payer: MEDICARE

## 2024-03-08 PROCEDURE — 93798 PHYS/QHP OP CAR RHAB W/ECG: CPT

## 2024-03-11 ENCOUNTER — HOSPITAL ENCOUNTER (OUTPATIENT)
Dept: CARDIAC REHAB | Age: 74
Setting detail: THERAPIES SERIES
Discharge: HOME | End: 2024-03-11
Payer: MEDICARE

## 2024-03-11 VITALS — WEIGHT: 242 LBS | HEIGHT: 62 IN | BODY MASS INDEX: 44.53 KG/M2

## 2024-03-11 PROCEDURE — 93798 PHYS/QHP OP CAR RHAB W/ECG: CPT

## 2024-03-13 ENCOUNTER — HOSPITAL ENCOUNTER (OUTPATIENT)
Dept: CARDIAC REHAB | Age: 74
Setting detail: THERAPIES SERIES
Discharge: HOME | End: 2024-03-13
Payer: MEDICARE

## 2024-03-13 VITALS — WEIGHT: 240 LBS | BODY MASS INDEX: 43.9 KG/M2

## 2024-03-13 PROCEDURE — 93798 PHYS/QHP OP CAR RHAB W/ECG: CPT

## 2024-03-15 ENCOUNTER — HOSPITAL ENCOUNTER (OUTPATIENT)
Dept: CARDIAC REHAB | Age: 74
Setting detail: THERAPIES SERIES
Discharge: HOME | End: 2024-03-15
Payer: MEDICARE

## 2024-03-15 VITALS — HEIGHT: 62 IN | BODY MASS INDEX: 44.11 KG/M2 | WEIGHT: 239.7 LBS

## 2024-03-15 PROCEDURE — 93798 PHYS/QHP OP CAR RHAB W/ECG: CPT

## 2024-03-20 ENCOUNTER — HOSPITAL ENCOUNTER (EMERGENCY)
Age: 74
Discharge: HOME | End: 2024-03-20
Attending: EMERGENCY MEDICINE
Payer: MEDICARE

## 2024-03-20 ENCOUNTER — HOSPITAL ENCOUNTER (OUTPATIENT)
Dept: CARDIAC REHAB | Age: 74
Setting detail: THERAPIES SERIES
Discharge: HOME | End: 2024-03-20
Payer: MEDICARE

## 2024-03-20 ENCOUNTER — APPOINTMENT (OUTPATIENT)
Dept: GENERAL RADIOLOGY | Age: 74
End: 2024-03-20
Payer: MEDICARE

## 2024-03-20 VITALS
HEIGHT: 62 IN | RESPIRATION RATE: 16 BRPM | SYSTOLIC BLOOD PRESSURE: 119 MMHG | OXYGEN SATURATION: 95 % | WEIGHT: 240 LBS | DIASTOLIC BLOOD PRESSURE: 74 MMHG | TEMPERATURE: 97.5 F | HEART RATE: 62 BPM | BODY MASS INDEX: 44.16 KG/M2

## 2024-03-20 DIAGNOSIS — S90.31XA CONTUSION OF RIGHT FOOT, INITIAL ENCOUNTER: Primary | ICD-10-CM

## 2024-03-20 DIAGNOSIS — S63.601A SPRAIN OF RIGHT THUMB, UNSPECIFIED SITE OF DIGIT, INITIAL ENCOUNTER: ICD-10-CM

## 2024-03-20 PROCEDURE — 99283 EMERGENCY DEPT VISIT LOW MDM: CPT

## 2024-03-20 PROCEDURE — 73130 X-RAY EXAM OF HAND: CPT

## 2024-03-20 PROCEDURE — 73630 X-RAY EXAM OF FOOT: CPT

## 2024-03-20 PROCEDURE — 93798 PHYS/QHP OP CAR RHAB W/ECG: CPT

## 2024-03-20 ASSESSMENT — LIFESTYLE VARIABLES
HOW MANY STANDARD DRINKS CONTAINING ALCOHOL DO YOU HAVE ON A TYPICAL DAY: PATIENT DOES NOT DRINK
HOW OFTEN DO YOU HAVE A DRINK CONTAINING ALCOHOL: NEVER

## 2024-03-20 ASSESSMENT — PAIN DESCRIPTION - LOCATION: LOCATION: FOOT

## 2024-03-20 ASSESSMENT — PAIN SCALES - GENERAL: PAINLEVEL_OUTOF10: 5

## 2024-03-20 ASSESSMENT — PAIN - FUNCTIONAL ASSESSMENT: PAIN_FUNCTIONAL_ASSESSMENT: 0-10

## 2024-03-20 ASSESSMENT — PAIN DESCRIPTION - DESCRIPTORS: DESCRIPTORS: THROBBING

## 2024-03-20 ASSESSMENT — PAIN DESCRIPTION - ORIENTATION: ORIENTATION: RIGHT

## 2024-03-20 NOTE — DISCHARGE INSTRUCTIONS
The x-ray of your right hand and right foot show no fracture.  You likely have a sprain of your hand and a contusion of your foot.  You have been given a postop hard soled shoe to wear for comfort.  Alternate Tylenol and Motrin at home for pain.  If it ever you feel like your safety is in question at home you can always come to the ER or call 911.

## 2024-03-20 NOTE — PROGRESS NOTES
Patient came to cardiac rehab and reports she does not know how much exercise she can do. Pt thinks her foot is fractured. Advised patient not to exercise today and go have xray of foot. Offered to take patient to xray. Pt declined and reports she is going to drive to main entrance and go get the xray done.

## 2024-03-22 ENCOUNTER — HOSPITAL ENCOUNTER (OUTPATIENT)
Dept: CARDIAC REHAB | Age: 74
Setting detail: THERAPIES SERIES
End: 2024-03-22
Payer: MEDICARE

## 2024-03-23 NOTE — ED PROVIDER NOTES
Blood Glucose Monitoring Suppl (BLOOD GLUCOSE MONITOR SYSTEM) w/Device KIT To check fsbs daily      Blood Gluc Meter Disp-Strips (BLOOD GLUCOSE METER DISPOSABLE) ANA To check fsbs daily      ferrous sulfate 325 (65 FE) MG tablet Take 1 tablet by mouth every other day Every other day      Disposable Gloves (LATEX GLOVES LARGE) MISC 1 Package by Does not apply route as needed. 2 each 0    carvedilol (COREG) 12.5 MG tablet Take 1 tablet by mouth 2 times daily (with meals). (Patient taking differently: Take 0.5 tablets by mouth 2 times daily) 180 tablet 3    furosemide (LASIX) 20 MG tablet Take 1 tablet by mouth daily. (Patient taking differently: Take 1 tablet by mouth as needed) 90 tablet 3    diltiazem (DILACOR XR) 240 MG ER capsule Take 1 capsule by mouth daily. 90 capsule 3    aspirin EC 81 MG EC tablet Take 1 tablet by mouth 2 times daily. 180 tablet 1    levothyroxine (SYNTHROID) 200 MCG tablet Take 175 mcg by mouth daily      nitroGLYCERIN (NITROLINGUAL) 0.4 MG/SPRAY spray Place 1 spray under the tongue every 5 minutes as needed       Allergies   Allergen Reactions    Demerol Shortness Of Breath    Demerol Hcl [Meperidine] Other (See Comments)     Stops my breathing         REVIEW OF SYSTEMS    Unless otherwise stated in this report, this patient's positive and negative responses for review of systems (constitutional, eyes, ENT, cardiovascular, respiratory, gastrointestinal, neurological, genitourinary, musculoskeletal, integument systems and systems related to the presenting problem) are either stated in the preceding paragraph, were not pertinent or were negative for the symptoms and/or complaints related to the medical problem.    PHYSICAL EXAM  /74   Pulse 62   Temp 97.5 °F (36.4 °C) (Oral)   Resp 16   Ht 1.575 m (5' 2\")   Wt 108.9 kg (240 lb)   SpO2 95%   BMI 43.90 kg/m²   GENERAL APPEARANCE: Awake and alert. Cooperative. No acute distress.  HEAD: Normocephalic. Atraumatic.  EYES: PERRL.

## 2024-03-25 ENCOUNTER — TELEPHONE (OUTPATIENT)
Dept: CARDIAC REHAB | Age: 74
End: 2024-03-25

## 2024-03-25 NOTE — TELEPHONE ENCOUNTER
Called pt number listed male answered stated \"this is not agata number and do not call again\" pt has missed x2 cardiac rehab appt.     Electronically signed by Tricia Way on 3/25/2024 at 7:08 AM

## 2024-03-27 ENCOUNTER — HOSPITAL ENCOUNTER (OUTPATIENT)
Dept: CARDIAC REHAB | Age: 74
Setting detail: THERAPIES SERIES
Discharge: HOME | End: 2024-03-27
Payer: MEDICARE

## 2024-03-27 NOTE — PLAN OF CARE
Cardiopulmonary Rehab Treatment Plan   Name: Mellissa Patel West Assessment Date: 3/27/2024   : 1950 Primary Diagnosis: Treatment Diagnosis 1: Angina      Age: 73 y.o. Referring Physician:  Francisco Orlando   MRN: [id number] Primary Care Physician: Dorcas Bean MD     Patient hurt foot and thumb at home. Patient currently wearing a shoe for stability. Waiting for release to return to cardiac rehab.       Treatment Diagnosis  Treatment Diagnosis 1: Angina  Referral Date: 24  Significant Cardiovascular History: Pacemaker  Co-morbidities: Pulmonary disease    Individual Treatment Plan  ITP Visit Type: Re-assessment  1st Date of Exercise : 24  ITP Next Review Date: 24  Visit #/Total Visits:   EF%: 69 %  ITP: Exercise; Psychosocial; Nutrition; Education    Exercise   Stages of Change: Preparation  Wells Total Score: 15  Test: Six minute walk test    Data Measured Before Walk  Heart Rate: 63  Blood Pressure: 110/70  O2 Saturation: 94  O2 Device: Room air    Data Measured during Walk  Indicate Mode of RPE: 6 minutes/submax  RPE Data Measured: Post  Symptoms: SOB (hip pain , right)    Data Measured Immediately After Walk  Distance Walked in : ft  Distance Walked (ft): 1265 ft  Peak Heart Rate: 101  Peak Blood Pressure: 142/60  Modified Sunny Scale: 3  RPE: 13  O2 Saturation: 95    Data Measured at 5 Minutes After Walk  Heart Rate: 72  Blood Pressure: 112/60  SpO2: 95 %  O2 Device: Room air    Exercise Prescription  Mode: Track; Treadmill; Bike; Stepper; Ergometer; Elliptical; Rower  Frequency per week: 2-3 supervised sessions weekly  Duration Per Session: 20-36+ minutes of steady aerobic exercise  Intensity METS      : 3-6 (Increase workloads 0.5-1.0 METS/weekly)  RPE: 11-14  Progression: Start w/1-2 sets of 8-12 repetitions for ea. muscle group. Use1-5# initially (very light resistance).  Resistance Training: Yes    Exercise Activity at Home  Type: walking  Frequency: 4 times daily  Duration: 20

## 2024-04-03 ENCOUNTER — HOSPITAL ENCOUNTER (OUTPATIENT)
Dept: CARDIAC REHAB | Age: 74
Setting detail: THERAPIES SERIES
Discharge: HOME | End: 2024-04-03

## 2024-04-08 ENCOUNTER — TELEPHONE (OUTPATIENT)
Dept: CARDIAC REHAB | Age: 74
End: 2024-04-08

## 2024-06-16 ENCOUNTER — HOSPITAL ENCOUNTER (EMERGENCY)
Age: 74
Discharge: HOME | End: 2024-06-16
Payer: MEDICARE

## 2024-06-16 ENCOUNTER — APPOINTMENT (OUTPATIENT)
Dept: GENERAL RADIOLOGY | Age: 74
End: 2024-06-16
Payer: MEDICARE

## 2024-06-16 VITALS
HEART RATE: 64 BPM | RESPIRATION RATE: 17 BRPM | TEMPERATURE: 97.1 F | OXYGEN SATURATION: 96 % | HEIGHT: 62 IN | DIASTOLIC BLOOD PRESSURE: 77 MMHG | BODY MASS INDEX: 43.9 KG/M2 | SYSTOLIC BLOOD PRESSURE: 135 MMHG

## 2024-06-16 DIAGNOSIS — M79.601 RIGHT ARM PAIN: Primary | ICD-10-CM

## 2024-06-16 DIAGNOSIS — M79.641 RIGHT HAND PAIN: ICD-10-CM

## 2024-06-16 PROCEDURE — 73090 X-RAY EXAM OF FOREARM: CPT

## 2024-06-16 PROCEDURE — 73130 X-RAY EXAM OF HAND: CPT

## 2024-06-16 PROCEDURE — 73060 X-RAY EXAM OF HUMERUS: CPT

## 2024-06-16 PROCEDURE — 96372 THER/PROPH/DIAG INJ SC/IM: CPT

## 2024-06-16 PROCEDURE — 99284 EMERGENCY DEPT VISIT MOD MDM: CPT

## 2024-06-16 PROCEDURE — 6360000002 HC RX W HCPCS: Performed by: PHYSICIAN ASSISTANT

## 2024-06-16 PROCEDURE — 73080 X-RAY EXAM OF ELBOW: CPT

## 2024-06-16 RX ORDER — KETOROLAC TROMETHAMINE 15 MG/ML
30 INJECTION, SOLUTION INTRAMUSCULAR; INTRAVENOUS ONCE
Status: COMPLETED | OUTPATIENT
Start: 2024-06-16 | End: 2024-06-16

## 2024-06-16 RX ADMIN — KETOROLAC TROMETHAMINE 30 MG: 15 INJECTION, SOLUTION INTRAMUSCULAR; INTRAVENOUS at 12:29

## 2024-06-16 ASSESSMENT — PAIN DESCRIPTION - ORIENTATION
ORIENTATION: RIGHT
ORIENTATION: RIGHT

## 2024-06-16 ASSESSMENT — PAIN DESCRIPTION - LOCATION
LOCATION: ARM
LOCATION: ARM

## 2024-06-16 ASSESSMENT — PAIN SCALES - GENERAL: PAINLEVEL_OUTOF10: 7

## 2024-06-16 ASSESSMENT — PAIN - FUNCTIONAL ASSESSMENT: PAIN_FUNCTIONAL_ASSESSMENT: 0-10
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
## 2024-06-16 NOTE — DISCHARGE INSTRUCTIONS
Follow up with your primary care provider in one week for a recheck    Take Tylenol ibuprofen for pain as needed    Return to the ED if you have any worsening symptoms.

## 2025-01-19 ENCOUNTER — APPOINTMENT (OUTPATIENT)
Dept: GENERAL RADIOLOGY | Age: 75
End: 2025-01-19
Payer: MEDICARE

## 2025-01-19 ENCOUNTER — HOSPITAL ENCOUNTER (EMERGENCY)
Age: 75
Discharge: HOME | End: 2025-01-19
Attending: STUDENT IN AN ORGANIZED HEALTH CARE EDUCATION/TRAINING PROGRAM
Payer: MEDICARE

## 2025-01-19 ENCOUNTER — APPOINTMENT (OUTPATIENT)
Dept: CT IMAGING | Age: 75
End: 2025-01-19
Payer: MEDICARE

## 2025-01-19 VITALS
SYSTOLIC BLOOD PRESSURE: 118 MMHG | HEART RATE: 62 BPM | WEIGHT: 225 LBS | OXYGEN SATURATION: 96 % | TEMPERATURE: 98.1 F | RESPIRATION RATE: 16 BRPM | DIASTOLIC BLOOD PRESSURE: 78 MMHG | BODY MASS INDEX: 41.41 KG/M2 | HEIGHT: 62 IN

## 2025-01-19 DIAGNOSIS — R07.9 CHEST PAIN, UNSPECIFIED TYPE: Primary | ICD-10-CM
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
## 2025-01-19 LAB
ALBUMIN SERPL-MCNC: 3.9 G/DL (ref 3.4–5)
ALBUMIN/GLOB SERPL: 1.9 {RATIO} (ref 1.1–2.2)
ALP SERPL-CCNC: 73 U/L (ref 40–129)
ALT SERPL-CCNC: 18 U/L (ref 10–40)
AMPHETAMINES UR QL SCN>1000 NG/ML: NORMAL
ANION GAP SERPL CALCULATED.3IONS-SCNC: 11 MMOL/L (ref 3–16)
AST SERPL-CCNC: 21 U/L (ref 15–37)
BACTERIA URNS QL MICRO: ABNORMAL /HPF
BARBITURATES UR QL SCN>200 NG/ML: NORMAL
BASE EXCESS BLDV CALC-SCNC: -2.2 MMOL/L (ref -3–3)
BASOPHILS # BLD: 0 K/UL (ref 0–0.2)
BASOPHILS NFR BLD: 0.6 %
BENZODIAZ UR QL SCN>200 NG/ML: NORMAL
BILIRUB SERPL-MCNC: 1.2 MG/DL (ref 0–1)
BILIRUB UR QL STRIP.AUTO: NEGATIVE
BUN SERPL-MCNC: 17 MG/DL (ref 7–20)
CALCIUM SERPL-MCNC: 7.8 MG/DL (ref 8.3–10.6)
CANNABINOIDS UR QL SCN>50 NG/ML: NORMAL
CHLORIDE SERPL-SCNC: 107 MMOL/L (ref 99–110)
CLARITY UR: CLEAR
CO2 BLDV-SCNC: 25 MMOL/L
CO2 SERPL-SCNC: 24 MMOL/L (ref 21–32)
COCAINE UR QL SCN: NORMAL
COHGB MFR BLDV: 5.3 % (ref 0–1.5)
COLOR UR: YELLOW
CREAT SERPL-MCNC: 0.7 MG/DL (ref 0.6–1.2)
DEPRECATED RDW RBC AUTO: 14.3 % (ref 12.4–15.4)
DRUG SCREEN COMMENT UR-IMP: NORMAL
EKG ATRIAL RATE: 64 BPM
EKG DIAGNOSIS: NORMAL
EKG P-R INTERVAL: 226 MS
EKG Q-T INTERVAL: 458 MS
EKG QRS DURATION: 82 MS
EKG QTC CALCULATION (BAZETT): 472 MS
EKG R AXIS: 5 DEGREES
EKG T AXIS: 7 DEGREES
EKG VENTRICULAR RATE: 64 BPM
EOSINOPHIL # BLD: 0.1 K/UL (ref 0–0.6)
EOSINOPHIL NFR BLD: 1.7 %
EPI CELLS #/AREA URNS HPF: ABNORMAL /HPF (ref 0–5)
FENTANYL SCREEN, URINE: NORMAL
FLUAV RNA RESP QL NAA+PROBE: NOT DETECTED
FLUBV RNA RESP QL NAA+PROBE: NOT DETECTED
GFR SERPLBLD CREATININE-BSD FMLA CKD-EPI: >90 ML/MIN/{1.73_M2}
GLUCOSE SERPL-MCNC: 130 MG/DL (ref 70–99)
GLUCOSE UR STRIP.AUTO-MCNC: NEGATIVE MG/DL
HCO3 BLDV-SCNC: 23.6 MMOL/L (ref 23–29)
HCT VFR BLD AUTO: 33.4 % (ref 36–48)
HGB BLD-MCNC: 11.1 G/DL (ref 12–16)
HGB UR QL STRIP.AUTO: ABNORMAL
KETONES UR STRIP.AUTO-MCNC: NEGATIVE MG/DL
LEUKOCYTE ESTERASE UR QL STRIP.AUTO: NEGATIVE
LYMPHOCYTES # BLD: 2.7 K/UL (ref 1–5.1)
LYMPHOCYTES NFR BLD: 34 %
MCH RBC QN AUTO: 32.5 PG (ref 26–34)
MCHC RBC AUTO-ENTMCNC: 33.1 G/DL (ref 31–36)
MCV RBC AUTO: 98.1 FL (ref 80–100)
METHADONE UR QL SCN>300 NG/ML: NORMAL
METHGB MFR BLDV: 0 %
MONOCYTES # BLD: 0.6 K/UL (ref 0–1.3)
MONOCYTES NFR BLD: 7.3 %
NEUTROPHILS # BLD: 4.5 K/UL (ref 1.7–7.7)
NEUTROPHILS NFR BLD: 56.4 %
NITRITE UR QL STRIP.AUTO: NEGATIVE
NT-PROBNP SERPL-MCNC: 266 PG/ML (ref 0–124)
O2 THERAPY: ABNORMAL
OPIATES UR QL SCN>300 NG/ML: NORMAL
OXYCODONE UR QL SCN: NORMAL
PCO2 BLDV: 44.8 MMHG (ref 40–50)
PCP UR QL SCN>25 NG/ML: NORMAL
PH BLDV: 7.34 [PH] (ref 7.35–7.45)
PH UR STRIP.AUTO: 6 [PH] (ref 5–8)
PH UR STRIP: 6 [PH]
PLATELET # BLD AUTO: 150 K/UL (ref 135–450)
PMV BLD AUTO: 9.5 FL (ref 5–10.5)
PO2 BLDV: 33.1 MMHG (ref 25–40)
POTASSIUM SERPL-SCNC: 3.6 MMOL/L (ref 3.5–5.1)
PROT SERPL-MCNC: 6 G/DL (ref 6.4–8.2)
PROT UR STRIP.AUTO-MCNC: NEGATIVE MG/DL
RBC # BLD AUTO: 3.4 M/UL (ref 4–5.2)
RBC #/AREA URNS HPF: ABNORMAL /HPF (ref 0–4)
SAO2 % BLDV: 60 %
SARS-COV-2 RNA RESP QL NAA+PROBE: NOT DETECTED
SODIUM SERPL-SCNC: 142 MMOL/L (ref 136–145)
SP GR UR STRIP.AUTO: 1.02 (ref 1–1.03)
TROPONIN, HIGH SENSITIVITY: <6 NG/L (ref 0–14)
TROPONIN, HIGH SENSITIVITY: <6 NG/L (ref 0–14)
UA COMPLETE W REFLEX CULTURE PNL UR: ABNORMAL
UA DIPSTICK W REFLEX MICRO PNL UR: YES
URN SPEC COLLECT METH UR: ABNORMAL
UROBILINOGEN UR STRIP-ACNC: 0.2 E.U./DL
WBC # BLD AUTO: 8 K/UL (ref 4–11)
WBC #/AREA URNS HPF: ABNORMAL /HPF (ref 0–5)

## 2025-01-19 PROCEDURE — 6370000000 HC RX 637 (ALT 250 FOR IP): Performed by: STUDENT IN AN ORGANIZED HEALTH CARE EDUCATION/TRAINING PROGRAM

## 2025-01-19 PROCEDURE — 84484 ASSAY OF TROPONIN QUANT: CPT

## 2025-01-19 PROCEDURE — 70450 CT HEAD/BRAIN W/O DYE: CPT

## 2025-01-19 PROCEDURE — 81001 URINALYSIS AUTO W/SCOPE: CPT

## 2025-01-19 PROCEDURE — 82803 BLOOD GASES ANY COMBINATION: CPT

## 2025-01-19 PROCEDURE — 99285 EMERGENCY DEPT VISIT HI MDM: CPT

## 2025-01-19 PROCEDURE — 93010 ELECTROCARDIOGRAM REPORT: CPT | Performed by: INTERNAL MEDICINE

## 2025-01-19 PROCEDURE — 80053 COMPREHEN METABOLIC PANEL: CPT

## 2025-01-19 PROCEDURE — 93005 ELECTROCARDIOGRAM TRACING: CPT | Performed by: STUDENT IN AN ORGANIZED HEALTH CARE EDUCATION/TRAINING PROGRAM

## 2025-01-19 PROCEDURE — 85025 COMPLETE CBC W/AUTO DIFF WBC: CPT

## 2025-01-19 PROCEDURE — 83880 ASSAY OF NATRIURETIC PEPTIDE: CPT

## 2025-01-19 PROCEDURE — 71045 X-RAY EXAM CHEST 1 VIEW: CPT

## 2025-01-19 PROCEDURE — 87636 SARSCOV2 & INF A&B AMP PRB: CPT

## 2025-01-19 PROCEDURE — 80307 DRUG TEST PRSMV CHEM ANLYZR: CPT

## 2025-01-19 RX ORDER — ISOSORBIDE MONONITRATE 30 MG/1
30 TABLET, EXTENDED RELEASE ORAL DAILY
Qty: 14 TABLET | Refills: 0 | Status: SHIPPED | OUTPATIENT
Start: 2025-01-19

## 2025-01-19 RX ORDER — ASPIRIN 325 MG
325 TABLET ORAL ONCE
Status: COMPLETED | OUTPATIENT
Start: 2025-01-19 | End: 2025-01-19

## 2025-01-19 RX ORDER — ISOSORBIDE MONONITRATE 30 MG/1
30 TABLET, EXTENDED RELEASE ORAL ONCE
Status: COMPLETED | OUTPATIENT
Start: 2025-01-19 | End: 2025-01-19

## 2025-01-19 RX ADMIN — ASPIRIN 325 MG: 325 TABLET ORAL at 04:44

## 2025-01-19 RX ADMIN — ISOSORBIDE MONONITRATE 30 MG: 30 TABLET, EXTENDED RELEASE ORAL at 05:27

## 2025-01-19 ASSESSMENT — PAIN DESCRIPTION - LOCATION: LOCATION: CHEST

## 2025-01-19 ASSESSMENT — PAIN - FUNCTIONAL ASSESSMENT: PAIN_FUNCTIONAL_ASSESSMENT: 0-10

## 2025-01-19 ASSESSMENT — PAIN SCALES - GENERAL: PAINLEVEL_OUTOF10: 4

## 2025-01-19 ASSESSMENT — PAIN DESCRIPTION - ORIENTATION: ORIENTATION: LEFT

## 2025-01-19 ASSESSMENT — PAIN DESCRIPTION - FREQUENCY: FREQUENCY: CONTINUOUS

## 2025-01-19 ASSESSMENT — PAIN DESCRIPTION - PAIN TYPE: TYPE: ACUTE PAIN

## 2025-01-19 NOTE — ED PROVIDER NOTES
condition.    I estimate there is LOW risk for ACUTE CORONARY SYNDROME, PULMONARY EMBOLISM, PNEUMOTHORAX, RUPTURED ESOPHAGUS OR THORACIC AORTIC DISSECTION, thus I consider the discharge disposition reasonable. Mellissa Grullon and I have discussed the diagnosis and risks, and we agree with discharging home with close follow-up. We also discussed returning to the Emergency Department immediately if new or worsening symptoms occur. We have discussed the symptoms which are most concerning that necessitate immediate return.    Vitals:    Vitals:    01/19/25 0217 01/19/25 0259 01/19/25 0527   BP: (!) 145/81  118/78   Pulse: 64     Resp: 16 16    Temp: 98.1 °F (36.7 °C)     SpO2: 92% 98%    Weight: 102.1 kg (225 lb)     Height: 1.575 m (5' 2\")         CRITICAL CARE TIME     I personally spent a total of 0 minutes of critical care time in obtaining history, performing a physical exam, bedside monitoring of interventions, collecting and interpreting tests and discussion with consultants but excluding time spent performing procedures, treating other patients and teaching time.                   FINAL IMPRESSION      1. Chest pain, unspecified type    2. Fall, initial encounter          DISPOSITION/PLAN   Disposition: DISPOSITION Decision To Discharge 01/19/2025 05:23:12 AM   DISPOSITION CONDITION Stable         DISCHARGE MEDICATIONS:  Patient was given scripts for the following medications. I counseled patient how to take these medications:  New Prescriptions    ISOSORBIDE MONONITRATE (IMDUR) 30 MG EXTENDED RELEASE TABLET    Take 1 tablet by mouth daily       DISCONTINUED MEDICATIONS:  Discontinued Medications    No medications on file       FOLLOW UP:  [facility] cardiology    Go on 1/21/2025  As scheduled    Dorcas Bean MD  [id number] [facility] [address]  [phone]    Schedule an appointment as soon as possible for a visit       [facility] Emergency Department  [address] [place] [address]

## 2025-01-19 NOTE — ED NOTES
0440,    Transfer center called back regarding a call to [facility] for cardiology. Cardiologist was on the phone and I transferred the call to DR [facility]A [facility]
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
